# Patient Record
Sex: FEMALE | Race: WHITE | NOT HISPANIC OR LATINO | Employment: FULL TIME
[De-identification: names, ages, dates, MRNs, and addresses within clinical notes are randomized per-mention and may not be internally consistent; named-entity substitution may affect disease eponyms.]

---

## 2022-05-06 ENCOUNTER — TRANSCRIBE ORDERS (OUTPATIENT)
Dept: OTHER | Age: 42
End: 2022-05-06

## 2022-05-06 ENCOUNTER — PATIENT OUTREACH (OUTPATIENT)
Dept: ONCOLOGY | Facility: CLINIC | Age: 42
End: 2022-05-06
Payer: COMMERCIAL

## 2022-05-06 DIAGNOSIS — C54.1 ENDOMETRIAL CANCER (H): Primary | ICD-10-CM

## 2022-05-06 NOTE — PROGRESS NOTES
New Patient Oncology Nurse Navigator Note     Referring provider: Zack Nazario DO      Referring Clinic/Organization: [facility]     Referred to (specialty:) Gyn Onc     Date Referral Received: May 6, 2022     Evaluation for:  Endometrial cancer     Clinical History (per Nurse review of records provided):    Patient is a 42 year old female who presented for surgical consultation with Dr. Dumont of [facility] due to heavy mesntrual bleeding.  Dr. Dumont performed endometrial biopsy.    3/10/22 Transvaginal ultrasound   Uterus measures 8.0 x 5.0 x 6.0 cm.  Overall homogeneous echogenicity.   Endometrium is homogeneous at 5 mm in thickness.  IUD cannot be visualized.  No   endometrial fluid.     Right ovary not well seen.  No gross right adnexal abnormality.  Left ovary   measures 3.4 x 2.0 x 2.6 cm.  Incidental 2.8 cm simple cysts noted, small   daughter cyst.  No free fluid.     IMPRESSION:   1. IUD not identified.  Endometrium appears normal.   2. No concerning adnexal abnormality.    4/25/22 -   A. ENDOMETRIUM, BIOPSY  --ENDOMETRIOID ADENOCARCINOMA, FIGO GRADE 1 OF 3, ARISING IN A BACKGROUND OF ATYPICAL ENDOMETRIAL HYPERPLASIA  --MISMATCH REPAIR PROTEIN IMMUNOHISTOCHEMISTRY IS NORMAL    Records Location: Care Everywhere, Media and See Bookmarked material

## 2022-05-09 ENCOUNTER — DOCUMENTATION ONLY (OUTPATIENT)
Dept: ONCOLOGY | Facility: CLINIC | Age: 42
End: 2022-05-09
Payer: COMMERCIAL

## 2022-05-09 NOTE — PROGRESS NOTES
Action May 9, 2022 10:57 AM ABT   Action Taken Records updated in CE, image request sent to [facility] and     12:12 PM  Images from  received and resolved to PACS

## 2022-05-10 NOTE — TELEPHONE ENCOUNTER
RECORDS STATUS - ALL OTHER DIAGNOSIS      RECORDS RECEIVED FROM: PHILL Roman   DATE RECEIVED:    NOTES STATUS DETAILS   OFFICE NOTE from referring provider Zack Castillo DO: 22   DISCHARGE SUMMARY from hospital THAIS - Abel 22   OPERATIVE REPORT PHILL Spence  22: Endometrium Bx    HP  7/15/19: Endometrium Bx   MEDICATION LIST [facility]   LABS     PATHOLOGY REPORTS [facility] [employer] [facility] 22   ANYTHING RELATED TO DIAGNOSIS Epic/CE 22   IMAGING (NEED IMAGES & REPORT)     ULTRASOUND PACS 3/10/22: Abel    19, 17: PHILL

## 2022-05-15 NOTE — PROGRESS NOTES
"                        Consult Notes on Referred Patient    Date: 5/15/2022       Dr. Ratna Avina MD  No address on file       RE: Indigo Gunter  : 1980  MARY: 2022    Dear Dr. Referred Self:    I had the pleasure of seeing your patient Indigo Gunter here at the Gynecologic Cancer Clinic at the [facility] on 2022.  As you know she is a very pleasant 42 year old woman with a recent diagnosis of  EMCA G1.  Given these findings she was subsequently sent to the Gynecologic Cancer Clinic for new patient consultation.     She was recently evaluated by DR. Dumont who reported the following:    \" Indigo Gunter is a 42 Y  female who presents for surgical consultation.     The patient continues to have heavy menstrual bleeding. Due to the heavy bleeding she was having last week and, she required the use of Provera. She has not had unprotected intercourse for the past 2 weeks, and desires to proceed with Depo-Provera in the meantime until her surgery can be performed.    The patient desires to proceed with definitive management of her heavy menstrual bleeding with robotic assisted total laparoscopic hysterectomy.    The patient had previous had an IUD placed, however this was expelled. Her second IUD had been inserted. Due to heavy bleeding, she had a high index of suspicion of expulsion of the device, however she never actually visualized the device after was expelled. She had an ultrasound on 3/10/2022 which did not show any evidence of IUD within the endometrium.\"      22 EMBx:  Final Diagnosis     A. ENDOMETRIUM, BIOPSY  --ENDOMETRIOID ADENOCARCINOMA, FIGO GRADE 1 OF 3, ARISING IN A BACKGROUND OF ATYPICAL ENDOMETRIAL HYPERPLASIA  --MISMATCH REPAIR PROTEIN IMMUNOHISTOCHEMISTRY IS NORMAL             Review of Systems:    Systemic           no weight changes; no fever; no chills; no night sweats; no appetite changes  Skin           no rashes, or lesions  Eye           no " "irritation; no changes in vision  Ricky-Laryngeal           no dysphagia; no hoarseness   Pulmonary    no cough; no shortness of breath  Cardiovascular    no chest pain; no palpitations  Gastrointestinal    no diarrhea; no constipation; no abdominal pain; no changes in bowel  habits; no blood in stool  Genitourinary   no urinary frequency; no urinary urgency; no dysuria; no pain; no abnormal vaginal discharge; no abnormal vaginal bleeding  Breast   no breast discharge; no breast changes; no breast pain  Musculoskeletal    no myalgias; no arthralgias; no back pain  Psychiatric           no depressed mood; no anxiety    Hematologic           no tender lymph nodes; no noticeable swellings or lumps   Endocrine    no hot flashes; no heat/cold intolerance         Neurological   no tremor; no numbness and tingling; no headaches; no difficulty  sleeping      Past Medical History:    No past medical history on file.      Past Surgical History:    No past surgical history on file.      Health Maintenance:  Health Maintenance Due   Topic Date Due     PREVENTIVE CARE VISIT  Never done     ADVANCE CARE PLANNING  Never done     COVID-19 Vaccine (1) Never done     HIV SCREENING  Never done     HEPATITIS C SCREENING  Never done     PHQ-2 (once per calendar year)  Never done       Last Pap Smear: Up to date per patient - no abnormal paps    Last Mammogram: 2021 Nl per patient    Last Colonoscopy: none      Current Medications:     currently has no medications in their medication list.       Allergies:     Patient has no allergy information on record.        Social History:     Social History     Tobacco Use     Smoking status: Not on file     Smokeless tobacco: Not on file   Substance Use Topics     Alcohol use: Not on file       History   Drug Use Not on file           Physical Exam:     PS 0  VS: /83 (BP Location: Right arm, Patient Position: Sitting)   Pulse 66   Temp 98.3  F (36.8  C) (Oral)   Ht 1.715 m (5' 7.52\")   Wt " (!) 159.2 kg (351 lb)   SpO2 97%   BMI 54.13 kg/m      General Appearance: healthy and alert, no distress     HEENT:  no thyromegaly, no palpable nodules or masses        Cardiovascular: regular rate and rhythm, no gallops, rubs or murmurs     Respiratory: lungs clear, no rales, rhonchi or wheezes, normal diaphragmatic excursion    Musculoskeletal: extremities non tender and without edema    Skin: no lesions or rashes     Neurological: normal gait, no gross defects     Psychiatric: appropriate mood and affect                               Hematological: normal cervical, supraclavicular and inguinal lymph nodes     Gastrointestinal:       abdomen soft, non-tender, non-distended, no organomegaly or masses    Genitourinary: External genitalia and urethral meatus appears normal.  Vagina is smooth without nodularity or masses.  Cervix appears normal and without lesions.  Bimanual exam reveal no masses, nodularity or fullness.  Recto-vaginal exam confirms these findings.      Assessment:    Indigo Gunter is a 42 year old woman with a new diagnosis of EMCA G1.     A total of 60 minutes was spent with the patient, 40 minutes of which were spent in counseling the patient and/or treatment planning.      Plan:     1.)   EMCA  -  we have discussed the pathologic an clinical findings and she is aware of the potential for occult malignancy that is worse than her current diagnosis.  We have discussed options including medical and surgical management. After a comprehensive discussion of the relative risks and benefits of each strategy she is inclined to surgery, which is reasonable.  I have discussed the potential risks and benefits of ovarian removal at the time of hysterectomy and she is inclined to TLH/BSO, with staging as indicated by the intra-operative findings.  Will plan for sentinel LN biopsy.    WE discussed the pros and cons of ovarian preservation and I have advocated for removal.      She is amenable to bariatric  surgery clinic referral for weight loss management after surgery as part of a more global health approach     2.) Genetic risk factors were assessed and the patient does not meet the qualifications for a referral.      3.) Labs and/or tests ordered include:  Pre-op labs.     4.) Health maintenance issues addressed today include none.    5.) Pre-op teaching was completed today.  Risks of surgery were discussed to include: bleeding, transfusion, infection, unintentional injury to surrounding organs/structures.      Thank you for allowing us to participate in the care of your patient.         Sincerely,        Patient Care Team:  Kary Garcia APRN CNP as PCP - General  SELF, REFERRED

## 2022-05-16 ENCOUNTER — ONCOLOGY VISIT (OUTPATIENT)
Dept: ONCOLOGY | Facility: CLINIC | Age: 42
End: 2022-05-16
Attending: OBSTETRICS & GYNECOLOGY
Payer: COMMERCIAL

## 2022-05-16 ENCOUNTER — PRE VISIT (OUTPATIENT)
Dept: ONCOLOGY | Facility: CLINIC | Age: 42
End: 2022-05-16
Payer: COMMERCIAL

## 2022-05-16 ENCOUNTER — ANCILLARY PROCEDURE (OUTPATIENT)
Dept: GENERAL RADIOLOGY | Facility: CLINIC | Age: 42
End: 2022-05-16
Attending: OBSTETRICS & GYNECOLOGY
Payer: COMMERCIAL

## 2022-05-16 ENCOUNTER — LAB (OUTPATIENT)
Dept: LAB | Facility: CLINIC | Age: 42
End: 2022-05-16

## 2022-05-16 VITALS
SYSTOLIC BLOOD PRESSURE: 120 MMHG | HEART RATE: 66 BPM | BODY MASS INDEX: 44.41 KG/M2 | HEIGHT: 68 IN | TEMPERATURE: 98.3 F | OXYGEN SATURATION: 97 % | DIASTOLIC BLOOD PRESSURE: 83 MMHG | WEIGHT: 293 LBS

## 2022-05-16 DIAGNOSIS — C54.1 ENDOMETRIAL CANCER (H): ICD-10-CM

## 2022-05-16 LAB
ALBUMIN SERPL-MCNC: 3.8 G/DL (ref 3.4–5)
ALP SERPL-CCNC: 119 U/L (ref 40–150)
ALT SERPL W P-5'-P-CCNC: 26 U/L (ref 0–50)
ANION GAP SERPL CALCULATED.3IONS-SCNC: 7 MMOL/L (ref 3–14)
AST SERPL W P-5'-P-CCNC: 16 U/L (ref 0–45)
BASOPHILS # BLD AUTO: 0 10E3/UL (ref 0–0.2)
BASOPHILS NFR BLD AUTO: 0 %
BILIRUB SERPL-MCNC: 0.3 MG/DL (ref 0.2–1.3)
BUN SERPL-MCNC: 13 MG/DL (ref 7–30)
CALCIUM SERPL-MCNC: 9.4 MG/DL (ref 8.5–10.1)
CHLORIDE BLD-SCNC: 107 MMOL/L (ref 94–109)
CO2 SERPL-SCNC: 26 MMOL/L (ref 20–32)
CREAT SERPL-MCNC: 0.7 MG/DL (ref 0.52–1.04)
EOSINOPHIL # BLD AUTO: 0.2 10E3/UL (ref 0–0.7)
EOSINOPHIL NFR BLD AUTO: 2 %
ERYTHROCYTE [DISTWIDTH] IN BLOOD BY AUTOMATED COUNT: 15 % (ref 10–15)
GFR SERPL CREATININE-BSD FRML MDRD: >90 ML/MIN/1.73M2
GLUCOSE BLD-MCNC: 123 MG/DL (ref 70–99)
HCT VFR BLD AUTO: 37.8 % (ref 35–47)
HGB BLD-MCNC: 11.1 G/DL (ref 11.7–15.7)
IMM GRANULOCYTES # BLD: 0 10E3/UL
IMM GRANULOCYTES NFR BLD: 0 %
LYMPHOCYTES # BLD AUTO: 3 10E3/UL (ref 0.8–5.3)
LYMPHOCYTES NFR BLD AUTO: 26 %
MCH RBC QN AUTO: 22.4 PG (ref 26.5–33)
MCHC RBC AUTO-ENTMCNC: 29.4 G/DL (ref 31.5–36.5)
MCV RBC AUTO: 76 FL (ref 78–100)
MONOCYTES # BLD AUTO: 0.9 10E3/UL (ref 0–1.3)
MONOCYTES NFR BLD AUTO: 8 %
NEUTROPHILS # BLD AUTO: 7.3 10E3/UL (ref 1.6–8.3)
NEUTROPHILS NFR BLD AUTO: 64 %
NRBC # BLD AUTO: 0 10E3/UL
NRBC BLD AUTO-RTO: 0 /100
PLATELET # BLD AUTO: 422 10E3/UL (ref 150–450)
POTASSIUM BLD-SCNC: 4.1 MMOL/L (ref 3.4–5.3)
PROT SERPL-MCNC: 8.1 G/DL (ref 6.8–8.8)
RBC # BLD AUTO: 4.95 10E6/UL (ref 3.8–5.2)
SODIUM SERPL-SCNC: 140 MMOL/L (ref 133–144)
WBC # BLD AUTO: 11.5 10E3/UL (ref 4–11)

## 2022-05-16 PROCEDURE — G0463 HOSPITAL OUTPT CLINIC VISIT: HCPCS

## 2022-05-16 PROCEDURE — 99205 OFFICE O/P NEW HI 60 MIN: CPT | Performed by: OBSTETRICS & GYNECOLOGY

## 2022-05-16 PROCEDURE — 80053 COMPREHEN METABOLIC PANEL: CPT | Performed by: PATHOLOGY

## 2022-05-16 PROCEDURE — 93010 ELECTROCARDIOGRAM REPORT: CPT | Mod: 59 | Performed by: INTERNAL MEDICINE

## 2022-05-16 PROCEDURE — 71046 X-RAY EXAM CHEST 2 VIEWS: CPT | Mod: GC | Performed by: RADIOLOGY

## 2022-05-16 PROCEDURE — 85025 COMPLETE CBC W/AUTO DIFF WBC: CPT | Performed by: PATHOLOGY

## 2022-05-16 PROCEDURE — 36415 COLL VENOUS BLD VENIPUNCTURE: CPT | Performed by: PATHOLOGY

## 2022-05-16 RX ORDER — ATORVASTATIN CALCIUM 40 MG/1
40 TABLET, FILM COATED ORAL
COMMUNITY
Start: 2020-06-10 | End: 2023-04-19

## 2022-05-16 RX ORDER — LISINOPRIL 10 MG/1
10 TABLET ORAL
COMMUNITY
Start: 2020-06-10 | End: 2023-01-09

## 2022-05-16 RX ORDER — MEDROXYPROGESTERONE ACETATE 10 MG
10 TABLET ORAL DAILY
Status: ON HOLD | COMMUNITY
End: 2022-06-09

## 2022-05-16 RX ORDER — CEFAZOLIN SODIUM IN 0.9 % NACL 3 G/100 ML
3 INTRAVENOUS SOLUTION, PIGGYBACK (ML) INTRAVENOUS
Status: CANCELLED | OUTPATIENT
Start: 2022-05-16

## 2022-05-16 RX ORDER — PHENAZOPYRIDINE HYDROCHLORIDE 200 MG/1
200 TABLET, FILM COATED ORAL ONCE
Status: CANCELLED | OUTPATIENT
Start: 2022-05-16 | End: 2022-05-16

## 2022-05-16 RX ORDER — CEFAZOLIN SODIUM IN 0.9 % NACL 3 G/100 ML
3 INTRAVENOUS SOLUTION, PIGGYBACK (ML) INTRAVENOUS
Status: DISCONTINUED | OUTPATIENT
Start: 2022-05-16 | End: 2022-05-16 | Stop reason: CLARIF

## 2022-05-16 RX ORDER — VIT C/HESPERIDIN/BIOFLAVONOIDS 500-100 MG
30 TABLET ORAL DAILY
COMMUNITY

## 2022-05-16 RX ORDER — ALBUTEROL SULFATE 90 UG/1
2 AEROSOL, METERED RESPIRATORY (INHALATION)
COMMUNITY
Start: 2022-04-19 | End: 2023-04-19

## 2022-05-16 RX ORDER — METRONIDAZOLE 500 MG/100ML
500 INJECTION, SOLUTION INTRAVENOUS
Status: CANCELLED | OUTPATIENT
Start: 2022-05-16

## 2022-05-16 RX ORDER — HEPARIN SODIUM 5000 [USP'U]/.5ML
5000 INJECTION, SOLUTION INTRAVENOUS; SUBCUTANEOUS
Status: CANCELLED | OUTPATIENT
Start: 2022-05-16

## 2022-05-16 RX ORDER — ASPIRIN 81 MG/1
TABLET, CHEWABLE ORAL
COMMUNITY
Start: 2022-02-26

## 2022-05-16 ASSESSMENT — PAIN SCALES - GENERAL: PAINLEVEL: NO PAIN (0)

## 2022-05-16 NOTE — LETTER
"    2022         RE: Indigo Gunter  [address]  [place] [address]        Dear Colleague,    Thank you for referring your patient, Indigo Gunter, to the [facility]. Please see a copy of my visit note below.                            Consult Notes on Referred Patient    Date: 5/15/2022       Dr. Ratna Avina MD  No address on file       RE: Indigo Gunter  : 1980  MARY: 2022    Dear Dr. Referred Self:    I had the pleasure of seeing your patient Indigo Gunter here at the Gynecologic Cancer Clinic at the [facility] on 2022.  As you know she is a very pleasant 42 year old woman with a recent diagnosis of  EMCA G1.  Given these findings she was subsequently sent to the Gynecologic Cancer Clinic for new patient consultation.     She was recently evaluated by DR. Dumont who reported the following:    \" Indigo Gunter is a 42 Y  female who presents for surgical consultation.     The patient continues to have heavy menstrual bleeding. Due to the heavy bleeding she was having last week and, she required the use of Provera. She has not had unprotected intercourse for the past 2 weeks, and desires to proceed with Depo-Provera in the meantime until her surgery can be performed.    The patient desires to proceed with definitive management of her heavy menstrual bleeding with robotic assisted total laparoscopic hysterectomy.    The patient had previous had an IUD placed, however this was expelled. Her second IUD had been inserted. Due to heavy bleeding, she had a high index of suspicion of expulsion of the device, however she never actually visualized the device after was expelled. She had an ultrasound on 3/10/2022 which did not show any evidence of IUD within the endometrium.\"      22 EMBx:  Final Diagnosis     A. ENDOMETRIUM, BIOPSY  --ENDOMETRIOID ADENOCARCINOMA, FIGO GRADE 1 OF 3, ARISING IN A BACKGROUND OF ATYPICAL " ENDOMETRIAL HYPERPLASIA  --MISMATCH REPAIR PROTEIN IMMUNOHISTOCHEMISTRY IS NORMAL             Review of Systems:    Systemic           no weight changes; no fever; no chills; no night sweats; no appetite changes  Skin           no rashes, or lesions  Eye           no irritation; no changes in vision  Ricky-Laryngeal           no dysphagia; no hoarseness   Pulmonary    no cough; no shortness of breath  Cardiovascular    no chest pain; no palpitations  Gastrointestinal    no diarrhea; no constipation; no abdominal pain; no changes in bowel  habits; no blood in stool  Genitourinary   no urinary frequency; no urinary urgency; no dysuria; no pain; no abnormal vaginal discharge; no abnormal vaginal bleeding  Breast   no breast discharge; no breast changes; no breast pain  Musculoskeletal    no myalgias; no arthralgias; no back pain  Psychiatric           no depressed mood; no anxiety    Hematologic           no tender lymph nodes; no noticeable swellings or lumps   Endocrine    no hot flashes; no heat/cold intolerance         Neurological   no tremor; no numbness and tingling; no headaches; no difficulty  sleeping      Past Medical History:    No past medical history on file.      Past Surgical History:    No past surgical history on file.      Health Maintenance:  Health Maintenance Due   Topic Date Due     PREVENTIVE CARE VISIT  Never done     ADVANCE CARE PLANNING  Never done     COVID-19 Vaccine (1) Never done     HIV SCREENING  Never done     HEPATITIS C SCREENING  Never done     PHQ-2 (once per calendar year)  Never done       Last Pap Smear: Up to date per patient - no abnormal paps    Last Mammogram: 2021 Nl per patient    Last Colonoscopy: none      Current Medications:     currently has no medications in their medication list.       Allergies:     Patient has no allergy information on record.        Social History:     Social History     Tobacco Use     Smoking status: Not on file     Smokeless tobacco: Not on  "file   Substance Use Topics     Alcohol use: Not on file       History   Drug Use Not on file           Physical Exam:     PS 0  VS: /83 (BP Location: Right arm, Patient Position: Sitting)   Pulse 66   Temp 98.3  F (36.8  C) (Oral)   Ht 1.715 m (5' 7.52\")   Wt (!) 159.2 kg (351 lb)   SpO2 97%   BMI 54.13 kg/m      General Appearance: healthy and alert, no distress     HEENT:  no thyromegaly, no palpable nodules or masses        Cardiovascular: regular rate and rhythm, no gallops, rubs or murmurs     Respiratory: lungs clear, no rales, rhonchi or wheezes, normal diaphragmatic excursion    Musculoskeletal: extremities non tender and without edema    Skin: no lesions or rashes     Neurological: normal gait, no gross defects     Psychiatric: appropriate mood and affect                               Hematological: normal cervical, supraclavicular and inguinal lymph nodes     Gastrointestinal:       abdomen soft, non-tender, non-distended, no organomegaly or masses    Genitourinary: External genitalia and urethral meatus appears normal.  Vagina is smooth without nodularity or masses.  Cervix appears normal and without lesions.  Bimanual exam reveal no masses, nodularity or fullness.  Recto-vaginal exam confirms these findings.      Assessment:    Indigo Gunter is a 42 year old woman with a new diagnosis of EMCA G1.     A total of 60 minutes was spent with the patient, 40 minutes of which were spent in counseling the patient and/or treatment planning.      Plan:     1.)   EMCA  -  we have discussed the pathologic an clinical findings and she is aware of the potential for occult malignancy that is worse than her current diagnosis.  We have discussed options including medical and surgical management. After a comprehensive discussion of the relative risks and benefits of each strategy she is inclined to surgery, which is reasonable.  I have discussed the potential risks and benefits of ovarian removal at the " time of hysterectomy and she is inclined to TLH/BSO, with staging as indicated by the intra-operative findings.  Will plan for sentinel LN biopsy.    WE discussed the pros and cons of ovarian preservation and I have advocated for removal.      She is amenable to bariatric surgery clinic referral for weight loss management after surgery as part of a more global health approach     2.) Genetic risk factors were assessed and the patient does not meet the qualifications for a referral.      3.) Labs and/or tests ordered include:  Pre-op labs.     4.) Health maintenance issues addressed today include none.    5.) Pre-op teaching was completed today.  Risks of surgery were discussed to include: bleeding, transfusion, infection, unintentional injury to surrounding organs/structures.      Thank you for allowing us to participate in the care of your patient.         Sincerely,        Kayden Harmon MD      CC  Patient Care Team:  Kary Garcia APRN CNP as PCP - General

## 2022-05-16 NOTE — NURSING NOTE
"Oncology Rooming Note    May 16, 2022 12:41 PM   Indigo Gunter is a 42 year old female who presents for:    Chief Complaint   Patient presents with     Oncology Clinic Visit     Endometrial cancer     Initial Vitals: /83 (BP Location: Right arm, Patient Position: Sitting)   Pulse 66   Temp 98.3  F (36.8  C) (Oral)   Ht 1.715 m (5' 7.52\")   Wt (!) 159.2 kg (351 lb)   SpO2 97%   BMI 54.13 kg/m   Estimated body mass index is 54.13 kg/m  as calculated from the following:    Height as of this encounter: 1.715 m (5' 7.52\").    Weight as of this encounter: 159.2 kg (351 lb). Body surface area is 2.75 meters squared.  No Pain (0) Comment: Data Unavailable   No LMP recorded. (Menstrual status: Birth Control).  Allergies reviewed: Yes  Medications reviewed: Yes    Medications: Medication refills not needed today.  Pharmacy name entered into EPIC: Data Unavailable    Clinical concerns: none       Arik Scales            "

## 2022-05-17 LAB
ATRIAL RATE - MUSE: 68 BPM
DIASTOLIC BLOOD PRESSURE - MUSE: NORMAL MMHG
INTERPRETATION ECG - MUSE: NORMAL
P AXIS - MUSE: 26 DEGREES
PR INTERVAL - MUSE: 138 MS
QRS DURATION - MUSE: 82 MS
QT - MUSE: 402 MS
QTC - MUSE: 427 MS
R AXIS - MUSE: 30 DEGREES
SYSTOLIC BLOOD PRESSURE - MUSE: NORMAL MMHG
T AXIS - MUSE: 21 DEGREES
VENTRICULAR RATE- MUSE: 68 BPM

## 2022-05-17 NOTE — NURSING NOTE
Pre Op Nurse Teaching Template    Relevant Diagnosis: endometrial cancer    Teaching Topic: Laparoscopy, removal of uterus, tubes, ovaries, cervix, lymph nodes.  POssible open surgery, possible cancer operation, look into bladder (cystoscopy),     Person(s) involved in teaching :  Patient  & spouse  Motivation Level:  Asks Questions:    Yes      Eager to Learn:     Yes     Cooperative:          Yes    Receptive (willing. Able to accept information):    Yes      Patient and those who are listed above demonstrates understanding of the following:   Reason for the appointment, diagnosis and treatment plan:   Yes   Knowledge of proper use of medications and conditions for which they are ordered (with special attention to potential side effects or drug interactions): Yes   Which situations necessitate calling provider and whom to contact: Yes         Nutritional needs and diet plan:  Yes      Pain management techniques:     Yes, Pain Scale   Diet:   Yes, [facility] Diet Instructions    Teaching Concerns addressed: Yes    Infection Prevention:  Patient and those who are listed above demonstrate understanding of the following:  Pre-Op CHG Bathing Instructions: Yes  Surgical procedure site care taught:   Yes   Signs and symptoms of infection taught: Yes       Instructional Materials Used/Given:  The [place] Before You Surgery Booklet  Showering or Bathing before Surgery Instructions & CHG Product  Hysterectomy Guidelines  Pain Assessment Tool   Home Care after Major Abdominal or Vaginal Surgery  Map  Accommodations Brochure  Phone numbers for [facility] and [facility]  Copy of Surgical Consent    Done Today:  Lab work,Chest Xray,   Preop Visit  not needed   Tests Ordered:  Post Op Visit Scheduled:tbd  Comments:    Surgery date/time:tbd      Covid test to be done 3-4 days before surgery date    Consent signed via IPAD and on file in media  .

## 2022-05-18 ENCOUNTER — LAB (OUTPATIENT)
Dept: LAB | Facility: CLINIC | Age: 42
End: 2022-05-18
Payer: COMMERCIAL

## 2022-05-18 ENCOUNTER — DOCUMENTATION ONLY (OUTPATIENT)
Dept: ONCOLOGY | Facility: CLINIC | Age: 42
End: 2022-05-18
Payer: COMMERCIAL

## 2022-05-18 DIAGNOSIS — C54.1 ENDOMETRIAL CANCER (H): Primary | ICD-10-CM

## 2022-05-18 PROCEDURE — 88321 CONSLTJ&REPRT SLD PREP ELSWR: CPT | Mod: GC | Performed by: PATHOLOGY

## 2022-05-18 NOTE — PROGRESS NOTES
RN Care Coordinator: Jovana Mercado; [phone]    Surgery is scheduled with Dr. Harmon on 6/8 at the [facility].  Scheduled per AVAILABILITY.    H&P to be completed by Surgeon     COVID-19 test: 6/[facility] at [facility]    Post-op: will be scheduled by the clinic    Patient will receive a phone call from pre-admission nurses 1-2 days prior to surgery with arrival and start time.    RNCC to contact patient, and so I do not need to contact the patient at this time. RNCC will contact me if I need to reach out to the patient. No further action needed at this time.     Surgery packet was provided by the RN during appointment

## 2022-05-20 LAB
PATH REPORT.COMMENTS IMP SPEC: ABNORMAL
PATH REPORT.COMMENTS IMP SPEC: ABNORMAL
PATH REPORT.COMMENTS IMP SPEC: YES
PATH REPORT.FINAL DX SPEC: ABNORMAL
PATH REPORT.GROSS SPEC: ABNORMAL
PATH REPORT.MICROSCOPIC SPEC OTHER STN: ABNORMAL
PATH REPORT.RELEVANT HX SPEC: ABNORMAL
PATH REPORT.RELEVANT HX SPEC: ABNORMAL
PATH REPORT.SITE OF ORIGIN SPEC: ABNORMAL

## 2022-05-30 DIAGNOSIS — Z11.59 ENCOUNTER FOR SCREENING FOR OTHER VIRAL DISEASES: Primary | ICD-10-CM

## 2022-06-07 ENCOUNTER — ANESTHESIA EVENT (OUTPATIENT)
Dept: SURGERY | Facility: CLINIC | Age: 42
DRG: 740 | End: 2022-06-07
Payer: COMMERCIAL

## 2022-06-08 ENCOUNTER — ANESTHESIA (OUTPATIENT)
Dept: SURGERY | Facility: CLINIC | Age: 42
DRG: 740 | End: 2022-06-08
Payer: COMMERCIAL

## 2022-06-08 ENCOUNTER — HOSPITAL ENCOUNTER (INPATIENT)
Facility: CLINIC | Age: 42
LOS: 1 days | Discharge: HOME | DRG: 740 | End: 2022-06-09
Attending: OBSTETRICS & GYNECOLOGY | Admitting: OBSTETRICS & GYNECOLOGY
Payer: COMMERCIAL

## 2022-06-08 DIAGNOSIS — C54.1 ENDOMETRIAL CANCER (H): Primary | ICD-10-CM

## 2022-06-08 LAB
ABO/RH(D): NORMAL
ANTIBODY SCREEN: NEGATIVE
ERYTHROCYTE [DISTWIDTH] IN BLOOD BY AUTOMATED COUNT: 15.7 % (ref 10–15)
GLUCOSE BLDC GLUCOMTR-MCNC: 138 MG/DL (ref 70–99)
HCG UR QL: NEGATIVE
HCT VFR BLD AUTO: 38.3 % (ref 35–47)
HGB BLD-MCNC: 11.4 G/DL (ref 11.7–15.7)
HOLD SPECIMEN: NORMAL
MCH RBC QN AUTO: 22.4 PG (ref 26.5–33)
MCHC RBC AUTO-ENTMCNC: 29.8 G/DL (ref 31.5–36.5)
MCV RBC AUTO: 75 FL (ref 78–100)
PLATELET # BLD AUTO: 355 10E3/UL (ref 150–450)
RBC # BLD AUTO: 5.1 10E6/UL (ref 3.8–5.2)
SPECIMEN EXPIRATION DATE: NORMAL
WBC # BLD AUTO: 16.4 10E3/UL (ref 4–11)

## 2022-06-08 PROCEDURE — 710N000012 HC RECOVERY PHASE 2, PER MINUTE: Performed by: OBSTETRICS & GYNECOLOGY

## 2022-06-08 PROCEDURE — 88305 TISSUE EXAM BY PATHOLOGIST: CPT | Mod: 26 | Performed by: PATHOLOGY

## 2022-06-08 PROCEDURE — 370N000017 HC ANESTHESIA TECHNICAL FEE, PER MIN: Performed by: OBSTETRICS & GYNECOLOGY

## 2022-06-08 PROCEDURE — 250N000013 HC RX MED GY IP 250 OP 250 PS 637: Performed by: ANESTHESIOLOGY

## 2022-06-08 PROCEDURE — 250N000009 HC RX 250: Performed by: ANESTHESIOLOGY

## 2022-06-08 PROCEDURE — 250N000011 HC RX IP 250 OP 636: Performed by: OBSTETRICS & GYNECOLOGY

## 2022-06-08 PROCEDURE — 250N000011 HC RX IP 250 OP 636: Performed by: ANESTHESIOLOGY

## 2022-06-08 PROCEDURE — 258N000003 HC RX IP 258 OP 636: Performed by: STUDENT IN AN ORGANIZED HEALTH CARE EDUCATION/TRAINING PROGRAM

## 2022-06-08 PROCEDURE — 710N000010 HC RECOVERY PHASE 1, LEVEL 2, PER MIN: Performed by: OBSTETRICS & GYNECOLOGY

## 2022-06-08 PROCEDURE — 360N000077 HC SURGERY LEVEL 4, PER MIN: Performed by: OBSTETRICS & GYNECOLOGY

## 2022-06-08 PROCEDURE — 250N000013 HC RX MED GY IP 250 OP 250 PS 637: Performed by: OBSTETRICS & GYNECOLOGY

## 2022-06-08 PROCEDURE — 0UT9FZZ RESECTION OF UTERUS, VIA NATURAL OR ARTIFICIAL OPENING WITH PERCUTANEOUS ENDOSCOPIC ASSISTANCE: ICD-10-PCS | Performed by: OBSTETRICS & GYNECOLOGY

## 2022-06-08 PROCEDURE — 88331 PATH CONSLTJ SURG 1 BLK 1SPC: CPT | Mod: 26 | Performed by: PATHOLOGY

## 2022-06-08 PROCEDURE — 250N000009 HC RX 250: Performed by: NURSE ANESTHETIST, CERTIFIED REGISTERED

## 2022-06-08 PROCEDURE — 36415 COLL VENOUS BLD VENIPUNCTURE: CPT | Performed by: STUDENT IN AN ORGANIZED HEALTH CARE EDUCATION/TRAINING PROGRAM

## 2022-06-08 PROCEDURE — 81025 URINE PREGNANCY TEST: CPT | Performed by: OBSTETRICS & GYNECOLOGY

## 2022-06-08 PROCEDURE — 88309 TISSUE EXAM BY PATHOLOGIST: CPT | Mod: 26 | Performed by: PATHOLOGY

## 2022-06-08 PROCEDURE — 88112 CYTOPATH CELL ENHANCE TECH: CPT | Mod: 26 | Performed by: PATHOLOGY

## 2022-06-08 PROCEDURE — 120N000002 HC R&B MED SURG/OB UMMC

## 2022-06-08 PROCEDURE — 07BC4ZX EXCISION OF PELVIS LYMPHATIC, PERCUTANEOUS ENDOSCOPIC APPROACH, DIAGNOSTIC: ICD-10-PCS | Performed by: OBSTETRICS & GYNECOLOGY

## 2022-06-08 PROCEDURE — 258N000003 HC RX IP 258 OP 636: Performed by: ANESTHESIOLOGY

## 2022-06-08 PROCEDURE — 999N000141 HC STATISTIC PRE-PROCEDURE NURSING ASSESSMENT: Performed by: OBSTETRICS & GYNECOLOGY

## 2022-06-08 PROCEDURE — 86850 RBC ANTIBODY SCREEN: CPT | Performed by: ANESTHESIOLOGY

## 2022-06-08 PROCEDURE — 0UT24ZZ RESECTION OF BILATERAL OVARIES, PERCUTANEOUS ENDOSCOPIC APPROACH: ICD-10-PCS | Performed by: OBSTETRICS & GYNECOLOGY

## 2022-06-08 PROCEDURE — 0TJB8ZZ INSPECTION OF BLADDER, VIA NATURAL OR ARTIFICIAL OPENING ENDOSCOPIC: ICD-10-PCS | Performed by: OBSTETRICS & GYNECOLOGY

## 2022-06-08 PROCEDURE — 250N000009 HC RX 250

## 2022-06-08 PROCEDURE — 250N000013 HC RX MED GY IP 250 OP 250 PS 637: Performed by: STUDENT IN AN ORGANIZED HEALTH CARE EDUCATION/TRAINING PROGRAM

## 2022-06-08 PROCEDURE — 88305 TISSUE EXAM BY PATHOLOGIST: CPT | Mod: TC | Performed by: OBSTETRICS & GYNECOLOGY

## 2022-06-08 PROCEDURE — 99024 POSTOP FOLLOW-UP VISIT: CPT | Performed by: OBSTETRICS & GYNECOLOGY

## 2022-06-08 PROCEDURE — 250N000009 HC RX 250: Performed by: OBSTETRICS & GYNECOLOGY

## 2022-06-08 PROCEDURE — 36415 COLL VENOUS BLD VENIPUNCTURE: CPT | Performed by: ANESTHESIOLOGY

## 2022-06-08 PROCEDURE — 250N000011 HC RX IP 250 OP 636: Performed by: NURSE ANESTHETIST, CERTIFIED REGISTERED

## 2022-06-08 PROCEDURE — 272N000001 HC OR GENERAL SUPPLY STERILE: Performed by: OBSTETRICS & GYNECOLOGY

## 2022-06-08 PROCEDURE — 88331 PATH CONSLTJ SURG 1 BLK 1SPC: CPT | Mod: TC | Performed by: OBSTETRICS & GYNECOLOGY

## 2022-06-08 PROCEDURE — 250N000025 HC SEVOFLURANE, PER MIN: Performed by: OBSTETRICS & GYNECOLOGY

## 2022-06-08 PROCEDURE — 85027 COMPLETE CBC AUTOMATED: CPT | Performed by: STUDENT IN AN ORGANIZED HEALTH CARE EDUCATION/TRAINING PROGRAM

## 2022-06-08 PROCEDURE — 88307 TISSUE EXAM BY PATHOLOGIST: CPT | Mod: 26 | Performed by: PATHOLOGY

## 2022-06-08 PROCEDURE — 250N000011 HC RX IP 250 OP 636: Performed by: STUDENT IN AN ORGANIZED HEALTH CARE EDUCATION/TRAINING PROGRAM

## 2022-06-08 PROCEDURE — 0UT74ZZ RESECTION OF BILATERAL FALLOPIAN TUBES, PERCUTANEOUS ENDOSCOPIC APPROACH: ICD-10-PCS | Performed by: OBSTETRICS & GYNECOLOGY

## 2022-06-08 RX ORDER — ACETAMINOPHEN 325 MG/1
650 TABLET ORAL EVERY 6 HOURS
Status: DISCONTINUED | OUTPATIENT
Start: 2022-06-08 | End: 2022-06-08

## 2022-06-08 RX ORDER — KETOROLAC TROMETHAMINE 30 MG/ML
15 INJECTION, SOLUTION INTRAMUSCULAR; INTRAVENOUS EVERY 6 HOURS
Status: DISCONTINUED | OUTPATIENT
Start: 2022-06-09 | End: 2022-06-08

## 2022-06-08 RX ORDER — PROPOFOL 10 MG/ML
INJECTION, EMULSION INTRAVENOUS PRN
Status: DISCONTINUED | OUTPATIENT
Start: 2022-06-08 | End: 2022-06-08

## 2022-06-08 RX ORDER — CEFAZOLIN SODIUM/WATER 3 G/30 ML
3 SYRINGE (ML) INTRAVENOUS
Status: COMPLETED | OUTPATIENT
Start: 2022-06-08 | End: 2022-06-08

## 2022-06-08 RX ORDER — PROCHLORPERAZINE MALEATE 5 MG
10 TABLET ORAL EVERY 6 HOURS PRN
Status: DISCONTINUED | OUTPATIENT
Start: 2022-06-08 | End: 2022-06-08

## 2022-06-08 RX ORDER — FENTANYL CITRATE 50 UG/ML
INJECTION, SOLUTION INTRAMUSCULAR; INTRAVENOUS PRN
Status: DISCONTINUED | OUTPATIENT
Start: 2022-06-08 | End: 2022-06-08

## 2022-06-08 RX ORDER — SODIUM CHLORIDE, SODIUM LACTATE, POTASSIUM CHLORIDE, CALCIUM CHLORIDE 600; 310; 30; 20 MG/100ML; MG/100ML; MG/100ML; MG/100ML
INJECTION, SOLUTION INTRAVENOUS CONTINUOUS
Status: DISCONTINUED | OUTPATIENT
Start: 2022-06-08 | End: 2022-06-09

## 2022-06-08 RX ORDER — ACETAMINOPHEN 325 MG/1
975 TABLET ORAL EVERY 6 HOURS
Status: DISCONTINUED | OUTPATIENT
Start: 2022-06-09 | End: 2022-06-09 | Stop reason: HOSPADM

## 2022-06-08 RX ORDER — ONDANSETRON 4 MG/1
4 TABLET, ORALLY DISINTEGRATING ORAL EVERY 6 HOURS PRN
Status: DISCONTINUED | OUTPATIENT
Start: 2022-06-08 | End: 2022-06-09 | Stop reason: HOSPADM

## 2022-06-08 RX ORDER — ACETAMINOPHEN 325 MG/1
975 TABLET ORAL ONCE
Status: COMPLETED | OUTPATIENT
Start: 2022-06-08 | End: 2022-06-08

## 2022-06-08 RX ORDER — METRONIDAZOLE 500 MG/100ML
500 INJECTION, SOLUTION INTRAVENOUS
Status: DISCONTINUED | OUTPATIENT
Start: 2022-06-08 | End: 2022-06-08 | Stop reason: HOSPADM

## 2022-06-08 RX ORDER — LIDOCAINE 40 MG/G
CREAM TOPICAL
Status: DISCONTINUED | OUTPATIENT
Start: 2022-06-08 | End: 2022-06-09 | Stop reason: HOSPADM

## 2022-06-08 RX ORDER — HEPARIN SODIUM 5000 [USP'U]/.5ML
5000 INJECTION, SOLUTION INTRAVENOUS; SUBCUTANEOUS
Status: COMPLETED | OUTPATIENT
Start: 2022-06-08 | End: 2022-06-08

## 2022-06-08 RX ORDER — IBUPROFEN 800 MG/1
800 TABLET, FILM COATED ORAL EVERY 6 HOURS PRN
Qty: 12 TABLET | Refills: 0 | Status: SHIPPED | OUTPATIENT
Start: 2022-06-08 | End: 2023-01-09

## 2022-06-08 RX ORDER — IBUPROFEN 200 MG
800 TABLET ORAL ONCE
Status: DISCONTINUED | OUTPATIENT
Start: 2022-06-08 | End: 2022-06-08

## 2022-06-08 RX ORDER — ONDANSETRON 4 MG/1
4 TABLET, ORALLY DISINTEGRATING ORAL EVERY 30 MIN PRN
Status: DISCONTINUED | OUTPATIENT
Start: 2022-06-08 | End: 2022-06-08 | Stop reason: HOSPADM

## 2022-06-08 RX ORDER — HYDROMORPHONE HCL IN WATER/PF 6 MG/30 ML
0.2 PATIENT CONTROLLED ANALGESIA SYRINGE INTRAVENOUS EVERY 5 MIN PRN
Status: DISCONTINUED | OUTPATIENT
Start: 2022-06-08 | End: 2022-06-08 | Stop reason: HOSPADM

## 2022-06-08 RX ORDER — FENTANYL CITRATE 50 UG/ML
50 INJECTION, SOLUTION INTRAMUSCULAR; INTRAVENOUS EVERY 5 MIN PRN
Status: DISCONTINUED | OUTPATIENT
Start: 2022-06-08 | End: 2022-06-08 | Stop reason: HOSPADM

## 2022-06-08 RX ORDER — OXYCODONE HYDROCHLORIDE 10 MG/1
10 TABLET ORAL EVERY 4 HOURS PRN
Status: DISCONTINUED | OUTPATIENT
Start: 2022-06-08 | End: 2022-06-08

## 2022-06-08 RX ORDER — NALOXONE HYDROCHLORIDE 0.4 MG/ML
0.4 INJECTION, SOLUTION INTRAMUSCULAR; INTRAVENOUS; SUBCUTANEOUS
Status: DISCONTINUED | OUTPATIENT
Start: 2022-06-08 | End: 2022-06-09 | Stop reason: HOSPADM

## 2022-06-08 RX ORDER — ONDANSETRON 2 MG/ML
4 INJECTION INTRAMUSCULAR; INTRAVENOUS EVERY 30 MIN PRN
Status: DISCONTINUED | OUTPATIENT
Start: 2022-06-08 | End: 2022-06-08 | Stop reason: HOSPADM

## 2022-06-08 RX ORDER — OXYCODONE HYDROCHLORIDE 5 MG/1
5 TABLET ORAL
Status: DISCONTINUED | OUTPATIENT
Start: 2022-06-08 | End: 2022-06-08

## 2022-06-08 RX ORDER — HYDROMORPHONE HCL IN WATER/PF 6 MG/30 ML
0.4 PATIENT CONTROLLED ANALGESIA SYRINGE INTRAVENOUS
Status: DISCONTINUED | OUTPATIENT
Start: 2022-06-08 | End: 2022-06-08

## 2022-06-08 RX ORDER — OXYCODONE HYDROCHLORIDE 5 MG/1
5 TABLET ORAL EVERY 4 HOURS PRN
Status: DISCONTINUED | OUTPATIENT
Start: 2022-06-08 | End: 2022-06-09

## 2022-06-08 RX ORDER — HYDROMORPHONE HCL IN WATER/PF 6 MG/30 ML
0.2 PATIENT CONTROLLED ANALGESIA SYRINGE INTRAVENOUS
Status: DISCONTINUED | OUTPATIENT
Start: 2022-06-08 | End: 2022-06-08

## 2022-06-08 RX ORDER — BUPIVACAINE HYDROCHLORIDE 2.5 MG/ML
INJECTION, SOLUTION EPIDURAL; INFILTRATION; INTRACAUDAL PRN
Status: DISCONTINUED | OUTPATIENT
Start: 2022-06-08 | End: 2022-06-08 | Stop reason: HOSPADM

## 2022-06-08 RX ORDER — INDOCYANINE GREEN AND WATER 25 MG
KIT INJECTION PRN
Status: DISCONTINUED | OUTPATIENT
Start: 2022-06-08 | End: 2022-06-08 | Stop reason: HOSPADM

## 2022-06-08 RX ORDER — LIDOCAINE 40 MG/G
CREAM TOPICAL
Status: DISCONTINUED | OUTPATIENT
Start: 2022-06-08 | End: 2022-06-08

## 2022-06-08 RX ORDER — AMOXICILLIN 250 MG
2 CAPSULE ORAL 2 TIMES DAILY
Status: DISCONTINUED | OUTPATIENT
Start: 2022-06-08 | End: 2022-06-09 | Stop reason: HOSPADM

## 2022-06-08 RX ORDER — ONDANSETRON 2 MG/ML
4 INJECTION INTRAMUSCULAR; INTRAVENOUS EVERY 6 HOURS PRN
Status: DISCONTINUED | OUTPATIENT
Start: 2022-06-08 | End: 2022-06-08

## 2022-06-08 RX ORDER — ATORVASTATIN CALCIUM 40 MG/1
40 TABLET, FILM COATED ORAL AT BEDTIME
Status: DISCONTINUED | OUTPATIENT
Start: 2022-06-08 | End: 2022-06-09 | Stop reason: HOSPADM

## 2022-06-08 RX ORDER — IBUPROFEN 600 MG/1
600 TABLET, FILM COATED ORAL EVERY 6 HOURS
Status: DISCONTINUED | OUTPATIENT
Start: 2022-06-14 | End: 2022-06-09

## 2022-06-08 RX ORDER — METHOCARBAMOL 500 MG/1
500 TABLET, FILM COATED ORAL EVERY 6 HOURS PRN
Status: DISCONTINUED | OUTPATIENT
Start: 2022-06-08 | End: 2022-06-09 | Stop reason: HOSPADM

## 2022-06-08 RX ORDER — BISACODYL 10 MG
10 SUPPOSITORY, RECTAL RECTAL DAILY
Status: DISCONTINUED | OUTPATIENT
Start: 2022-06-09 | End: 2022-06-09 | Stop reason: HOSPADM

## 2022-06-08 RX ORDER — OXYCODONE HYDROCHLORIDE 5 MG/1
5 TABLET ORAL EVERY 4 HOURS PRN
Status: DISCONTINUED | OUTPATIENT
Start: 2022-06-08 | End: 2022-06-08

## 2022-06-08 RX ORDER — OXYCODONE HYDROCHLORIDE 10 MG/1
10 TABLET ORAL EVERY 4 HOURS PRN
Status: DISCONTINUED | OUTPATIENT
Start: 2022-06-08 | End: 2022-06-09

## 2022-06-08 RX ORDER — ALBUTEROL SULFATE 90 UG/1
2 AEROSOL, METERED RESPIRATORY (INHALATION) EVERY 4 HOURS PRN
Status: DISCONTINUED | OUTPATIENT
Start: 2022-06-08 | End: 2022-06-09 | Stop reason: HOSPADM

## 2022-06-08 RX ORDER — LISINOPRIL 10 MG/1
10 TABLET ORAL DAILY
Status: DISCONTINUED | OUTPATIENT
Start: 2022-06-08 | End: 2022-06-09 | Stop reason: HOSPADM

## 2022-06-08 RX ORDER — ACETAMINOPHEN 325 MG/1
650 TABLET ORAL EVERY 6 HOURS PRN
Qty: 50 TABLET | Refills: 0 | Status: SHIPPED | OUTPATIENT
Start: 2022-06-08 | End: 2023-01-09

## 2022-06-08 RX ORDER — SODIUM CHLORIDE, SODIUM LACTATE, POTASSIUM CHLORIDE, CALCIUM CHLORIDE 600; 310; 30; 20 MG/100ML; MG/100ML; MG/100ML; MG/100ML
INJECTION, SOLUTION INTRAVENOUS CONTINUOUS
Status: DISCONTINUED | OUTPATIENT
Start: 2022-06-08 | End: 2022-06-08

## 2022-06-08 RX ORDER — AMOXICILLIN 250 MG
1 CAPSULE ORAL 2 TIMES DAILY
Status: DISCONTINUED | OUTPATIENT
Start: 2022-06-08 | End: 2022-06-08

## 2022-06-08 RX ORDER — HYDRALAZINE HYDROCHLORIDE 20 MG/ML
2.5-5 INJECTION INTRAMUSCULAR; INTRAVENOUS EVERY 10 MIN PRN
Status: DISCONTINUED | OUTPATIENT
Start: 2022-06-08 | End: 2022-06-08 | Stop reason: HOSPADM

## 2022-06-08 RX ORDER — POLYETHYLENE GLYCOL 3350 17 G/17G
17 POWDER, FOR SOLUTION ORAL DAILY PRN
Status: DISCONTINUED | OUTPATIENT
Start: 2022-06-09 | End: 2022-06-08

## 2022-06-08 RX ORDER — LABETALOL HYDROCHLORIDE 5 MG/ML
10 INJECTION, SOLUTION INTRAVENOUS
Status: DISCONTINUED | OUTPATIENT
Start: 2022-06-08 | End: 2022-06-08 | Stop reason: HOSPADM

## 2022-06-08 RX ORDER — ALBUTEROL SULFATE 0.83 MG/ML
2.5 SOLUTION RESPIRATORY (INHALATION) EVERY 4 HOURS PRN
Status: DISCONTINUED | OUTPATIENT
Start: 2022-06-08 | End: 2022-06-08 | Stop reason: HOSPADM

## 2022-06-08 RX ORDER — ONDANSETRON 4 MG/1
4 TABLET, ORALLY DISINTEGRATING ORAL EVERY 6 HOURS PRN
Status: DISCONTINUED | OUTPATIENT
Start: 2022-06-08 | End: 2022-06-08

## 2022-06-08 RX ORDER — SODIUM CHLORIDE, SODIUM LACTATE, POTASSIUM CHLORIDE, CALCIUM CHLORIDE 600; 310; 30; 20 MG/100ML; MG/100ML; MG/100ML; MG/100ML
INJECTION, SOLUTION INTRAVENOUS CONTINUOUS
Status: DISCONTINUED | OUTPATIENT
Start: 2022-06-08 | End: 2022-06-08 | Stop reason: HOSPADM

## 2022-06-08 RX ORDER — AMOXICILLIN 250 MG
1 CAPSULE ORAL 2 TIMES DAILY
Status: DISCONTINUED | OUTPATIENT
Start: 2022-06-08 | End: 2022-06-09 | Stop reason: HOSPADM

## 2022-06-08 RX ORDER — HYDROMORPHONE HCL IN WATER/PF 6 MG/30 ML
0.2 PATIENT CONTROLLED ANALGESIA SYRINGE INTRAVENOUS
Status: DISCONTINUED | OUTPATIENT
Start: 2022-06-08 | End: 2022-06-09 | Stop reason: HOSPADM

## 2022-06-08 RX ORDER — SODIUM CHLORIDE, SODIUM LACTATE, POTASSIUM CHLORIDE, CALCIUM CHLORIDE 600; 310; 30; 20 MG/100ML; MG/100ML; MG/100ML; MG/100ML
INJECTION, SOLUTION INTRAVENOUS CONTINUOUS PRN
Status: DISCONTINUED | OUTPATIENT
Start: 2022-06-08 | End: 2022-06-08

## 2022-06-08 RX ORDER — ONDANSETRON 2 MG/ML
INJECTION INTRAMUSCULAR; INTRAVENOUS PRN
Status: DISCONTINUED | OUTPATIENT
Start: 2022-06-08 | End: 2022-06-08

## 2022-06-08 RX ORDER — LIDOCAINE 40 MG/G
CREAM TOPICAL
Status: DISCONTINUED | OUTPATIENT
Start: 2022-06-08 | End: 2022-06-08 | Stop reason: HOSPADM

## 2022-06-08 RX ORDER — DEXAMETHASONE SODIUM PHOSPHATE 4 MG/ML
INJECTION, SOLUTION INTRA-ARTICULAR; INTRALESIONAL; INTRAMUSCULAR; INTRAVENOUS; SOFT TISSUE PRN
Status: DISCONTINUED | OUTPATIENT
Start: 2022-06-08 | End: 2022-06-08

## 2022-06-08 RX ORDER — PHENAZOPYRIDINE HYDROCHLORIDE 200 MG/1
200 TABLET, FILM COATED ORAL ONCE
Status: COMPLETED | OUTPATIENT
Start: 2022-06-08 | End: 2022-06-08

## 2022-06-08 RX ORDER — ACETAMINOPHEN 325 MG/1
650 TABLET ORAL EVERY 6 HOURS
Status: DISCONTINUED | OUTPATIENT
Start: 2022-06-12 | End: 2022-06-09 | Stop reason: HOSPADM

## 2022-06-08 RX ORDER — BISACODYL 10 MG
10 SUPPOSITORY, RECTAL RECTAL DAILY PRN
Status: DISCONTINUED | OUTPATIENT
Start: 2022-06-08 | End: 2022-06-08

## 2022-06-08 RX ORDER — NALOXONE HYDROCHLORIDE 0.4 MG/ML
0.2 INJECTION, SOLUTION INTRAMUSCULAR; INTRAVENOUS; SUBCUTANEOUS
Status: DISCONTINUED | OUTPATIENT
Start: 2022-06-08 | End: 2022-06-09 | Stop reason: HOSPADM

## 2022-06-08 RX ORDER — IBUPROFEN 400 MG/1
800 TABLET, FILM COATED ORAL EVERY 6 HOURS
Status: DISCONTINUED | OUTPATIENT
Start: 2022-06-09 | End: 2022-06-09 | Stop reason: HOSPADM

## 2022-06-08 RX ORDER — LIDOCAINE HYDROCHLORIDE 20 MG/ML
INJECTION, SOLUTION INFILTRATION; PERINEURAL PRN
Status: DISCONTINUED | OUTPATIENT
Start: 2022-06-08 | End: 2022-06-08

## 2022-06-08 RX ORDER — HYDROMORPHONE HCL IN WATER/PF 6 MG/30 ML
0.4 PATIENT CONTROLLED ANALGESIA SYRINGE INTRAVENOUS
Status: DISCONTINUED | OUTPATIENT
Start: 2022-06-08 | End: 2022-06-09 | Stop reason: HOSPADM

## 2022-06-08 RX ORDER — SIMETHICONE 80 MG
80 TABLET,CHEWABLE ORAL 4 TIMES DAILY PRN
Status: DISCONTINUED | OUTPATIENT
Start: 2022-06-08 | End: 2022-06-09 | Stop reason: HOSPADM

## 2022-06-08 RX ORDER — AMOXICILLIN 250 MG
1-2 CAPSULE ORAL 2 TIMES DAILY
Qty: 30 TABLET | Refills: 0 | Status: SHIPPED | OUTPATIENT
Start: 2022-06-08

## 2022-06-08 RX ORDER — ACETAMINOPHEN 325 MG/1
975 TABLET ORAL ONCE
Status: DISCONTINUED | OUTPATIENT
Start: 2022-06-08 | End: 2022-06-08

## 2022-06-08 RX ADMIN — Medication 10 MG: at 16:36

## 2022-06-08 RX ADMIN — FENTANYL CITRATE 50 MCG: 50 INJECTION, SOLUTION INTRAMUSCULAR; INTRAVENOUS at 15:54

## 2022-06-08 RX ADMIN — PROPOFOL 200 MG: 10 INJECTION, EMULSION INTRAVENOUS at 11:54

## 2022-06-08 RX ADMIN — FENTANYL CITRATE 100 MCG: 50 INJECTION, SOLUTION INTRAMUSCULAR; INTRAVENOUS at 11:54

## 2022-06-08 RX ADMIN — OXYCODONE HYDROCHLORIDE 5 MG: 5 TABLET ORAL at 19:15

## 2022-06-08 RX ADMIN — HYDROMORPHONE HYDROCHLORIDE 0.4 MG: 0.2 INJECTION, SOLUTION INTRAMUSCULAR; INTRAVENOUS; SUBCUTANEOUS at 19:05

## 2022-06-08 RX ADMIN — HYDROMORPHONE HYDROCHLORIDE 0.4 MG: 0.2 INJECTION, SOLUTION INTRAMUSCULAR; INTRAVENOUS; SUBCUTANEOUS at 22:00

## 2022-06-08 RX ADMIN — HYDROMORPHONE HYDROCHLORIDE 0.2 MG: 0.2 INJECTION, SOLUTION INTRAMUSCULAR; INTRAVENOUS; SUBCUTANEOUS at 16:07

## 2022-06-08 RX ADMIN — Medication 3 G: at 11:58

## 2022-06-08 RX ADMIN — PROCHLORPERAZINE EDISYLATE 10 MG: 5 INJECTION INTRAMUSCULAR; INTRAVENOUS at 19:26

## 2022-06-08 RX ADMIN — FENTANYL CITRATE 50 MCG: 50 INJECTION, SOLUTION INTRAMUSCULAR; INTRAVENOUS at 12:28

## 2022-06-08 RX ADMIN — Medication 20 MG: at 12:42

## 2022-06-08 RX ADMIN — LIDOCAINE HYDROCHLORIDE 100 MG: 20 INJECTION, SOLUTION INFILTRATION; PERINEURAL at 11:54

## 2022-06-08 RX ADMIN — PHENAZOPYRIDINE HYDROCHLORIDE 200 MG: 200 TABLET, FILM COATED ORAL at 09:31

## 2022-06-08 RX ADMIN — FENTANYL CITRATE 50 MCG: 50 INJECTION, SOLUTION INTRAMUSCULAR; INTRAVENOUS at 12:26

## 2022-06-08 RX ADMIN — MIDAZOLAM 2 MG: 1 INJECTION INTRAMUSCULAR; INTRAVENOUS at 11:49

## 2022-06-08 RX ADMIN — ACETAMINOPHEN 975 MG: 325 TABLET, FILM COATED ORAL at 16:15

## 2022-06-08 RX ADMIN — Medication 50 MG: at 11:55

## 2022-06-08 RX ADMIN — Medication 30 MG: at 13:34

## 2022-06-08 RX ADMIN — LISINOPRIL 10 MG: 10 TABLET ORAL at 21:59

## 2022-06-08 RX ADMIN — METRONIDAZOLE 500 MG: 500 INJECTION, SOLUTION INTRAVENOUS at 11:45

## 2022-06-08 RX ADMIN — HEPARIN SODIUM 5000 UNITS: 5000 INJECTION, SOLUTION INTRAVENOUS; SUBCUTANEOUS at 09:32

## 2022-06-08 RX ADMIN — OXYCODONE HYDROCHLORIDE 5 MG: 5 TABLET ORAL at 16:15

## 2022-06-08 RX ADMIN — KETOROLAC TROMETHAMINE 15 MG: 30 INJECTION, SOLUTION INTRAMUSCULAR at 19:15

## 2022-06-08 RX ADMIN — ACETAMINOPHEN 975 MG: 325 TABLET ORAL at 09:30

## 2022-06-08 RX ADMIN — FENTANYL CITRATE 50 MCG: 50 INJECTION, SOLUTION INTRAMUSCULAR; INTRAVENOUS at 15:25

## 2022-06-08 RX ADMIN — Medication 30 MG: at 12:27

## 2022-06-08 RX ADMIN — DEXAMETHASONE SODIUM PHOSPHATE 4 MG: 4 INJECTION, SOLUTION INTRA-ARTICULAR; INTRALESIONAL; INTRAMUSCULAR; INTRAVENOUS; SOFT TISSUE at 11:54

## 2022-06-08 RX ADMIN — ONDANSETRON 4 MG: 2 INJECTION INTRAMUSCULAR; INTRAVENOUS at 14:32

## 2022-06-08 RX ADMIN — FENTANYL CITRATE 50 MCG: 50 INJECTION, SOLUTION INTRAMUSCULAR; INTRAVENOUS at 15:44

## 2022-06-08 RX ADMIN — ATORVASTATIN CALCIUM 40 MG: 40 TABLET, FILM COATED ORAL at 22:00

## 2022-06-08 RX ADMIN — SODIUM CHLORIDE, POTASSIUM CHLORIDE, SODIUM LACTATE AND CALCIUM CHLORIDE: 600; 310; 30; 20 INJECTION, SOLUTION INTRAVENOUS at 19:28

## 2022-06-08 RX ADMIN — SUGAMMADEX 200 MG: 100 INJECTION, SOLUTION INTRAVENOUS at 14:44

## 2022-06-08 RX ADMIN — HYDROMORPHONE HYDROCHLORIDE 0.2 MG: 0.2 INJECTION, SOLUTION INTRAMUSCULAR; INTRAVENOUS; SUBCUTANEOUS at 16:13

## 2022-06-08 RX ADMIN — HYDROMORPHONE HYDROCHLORIDE 0.5 MG: 1 INJECTION, SOLUTION INTRAMUSCULAR; INTRAVENOUS; SUBCUTANEOUS at 14:24

## 2022-06-08 RX ADMIN — SODIUM CHLORIDE, POTASSIUM CHLORIDE, SODIUM LACTATE AND CALCIUM CHLORIDE: 600; 310; 30; 20 INJECTION, SOLUTION INTRAVENOUS at 11:45

## 2022-06-08 RX ADMIN — FLUTICASONE FUROATE AND VILANTEROL TRIFENATATE 1 PUFF: 100; 25 POWDER RESPIRATORY (INHALATION) at 21:59

## 2022-06-08 RX ADMIN — HYDROMORPHONE HYDROCHLORIDE 0.2 MG: 0.2 INJECTION, SOLUTION INTRAMUSCULAR; INTRAVENOUS; SUBCUTANEOUS at 16:21

## 2022-06-08 RX ADMIN — FENTANYL CITRATE 50 MCG: 50 INJECTION, SOLUTION INTRAMUSCULAR; INTRAVENOUS at 12:29

## 2022-06-08 RX ADMIN — ONDANSETRON 4 MG: 2 INJECTION INTRAMUSCULAR; INTRAVENOUS at 16:55

## 2022-06-08 RX ADMIN — DOCUSATE SODIUM 50 MG AND SENNOSIDES 8.6 MG 1 TABLET: 8.6; 5 TABLET, FILM COATED ORAL at 21:59

## 2022-06-08 RX ADMIN — FENTANYL CITRATE 50 MCG: 50 INJECTION, SOLUTION INTRAMUSCULAR; INTRAVENOUS at 16:01

## 2022-06-08 ASSESSMENT — ACTIVITIES OF DAILY LIVING (ADL)
ADLS_ACUITY_SCORE: 20
ADLS_ACUITY_SCORE: 20
ADLS_ACUITY_SCORE: 35
ADLS_ACUITY_SCORE: 20
ADLS_ACUITY_SCORE: 20

## 2022-06-08 NOTE — PROVIDER NOTIFICATION
Time of notification: 3:38 PM  Provider notified: OB Resident  Patient status: PACU      Temp:  [97.3  F (36.3  C)-97.9  F (36.6  C)] 97.3  F (36.3  C)  Pulse:  [70-81] 70  Resp:  [12-20] 15  BP: (141-160)/(88-95) 146/88  Cuff Mean (mmHg):  [111] 111  SpO2:  [94 %-100 %] 94 %    Paged resident regarding plans of home vs. Admission - resident confirmed patient will discharge home today.    Marisela Pickard RN on 6/8/2022 at 3:39 PM

## 2022-06-08 NOTE — ANESTHESIA PROCEDURE NOTES
Airway       Patient location during procedure: OR       Procedure Start/Stop Times: 6/8/2022 11:57 AM  Staff -        CRNA: Jackie Hargrove APRN CRNA       Performed By: CRNA  Consent for Airway        Urgency: elective  Indications and Patient Condition       Indications for airway management: heena-procedural       Induction type:intravenous       Mask difficulty assessment: 1 - vent by mask    Final Airway Details       Final airway type: endotracheal airway       Successful airway: ETT - single  Endotracheal Airway Details        ETT size (mm): 7.5       Cuffed: yes       Successful intubation technique: direct laryngoscopy       DL Blade Type: Sykes 2       Grade View of Cords: 1       Adjucts: stylet       Position: Right       Measured from: lips       Secured at (cm): 23       Bite block used: None    Post intubation assessment        Placement verified by: capnometry, equal breath sounds and chest rise        Number of attempts at approach: 1       Secured with: pink tape       Ease of procedure: easy       Dentition: Intact and Unchanged    Medication(s) Administered   Medication Administration Time: 6/8/2022 11:57 AM    Additional Comments       Patient ramped with 4 blankets.

## 2022-06-08 NOTE — DISCHARGE INSTRUCTIONS
[facility]  Same-Day Surgery   Adult Discharge Orders & Instructions     For 24 hours after surgery    Get plenty of rest.  A responsible adult must stay with you for at least 24 hours after you leave the hospital.   Do not drive or use heavy equipment.  If you have weakness or tingling, don't drive or use heavy equipment until this feeling goes away.  Do not drink alcohol.  Avoid strenuous or risky activities.  Ask for help when climbing stairs.   You may feel lightheaded.  IF so, sit for a few minutes before standing.  Have someone help you get up.   If you have nausea (feel sick to your stomach): Drink only clear liquids such as apple juice, ginger ale, broth or 7-Up.  Rest may also help.  Be sure to drink enough fluids.  Move to a regular diet as you feel able.  You may have a slight fever. Call the doctor if your fever is over 100 F (37.7 C) (taken under the tongue) or lasts longer than 24 hours.  You may have a dry mouth, a sore throat, muscle aches or trouble sleeping.  These should go away after 24 hours.  Do not make important or legal decisions.   Call your doctor for any of the followin.  Signs of infection (fever, growing tenderness at the surgery site, a large amount of drainage or bleeding, severe pain, foul-smelling drainage, redness, swelling).    2. It has been over 8 to 10 hours since surgery and you are still not able to urinate (pass water).    3.  Headache for over 24 hours.    To contact a doctor, call Dr Harmon at [phone] at the Women's Health/Gynecologic Clinic or:    '   [phone] and ask for the resident on call for   Gynecology (answered 24 hours a day)  '   Emergency Department:    [facility]: [phone]       (TTY for hearing impaired: [phone])

## 2022-06-08 NOTE — BRIEF OP NOTE
[facility]    Brief Operative Note    Pre-operative diagnosis: Endometrial cancer (H) [C54.1]  Post-operative diagnosis Same as pre-operative diagnosis - Frozen c/w Grade I Endometrioid Adenocarcinoma     Procedure: Procedure(s):  Laparoscopy, removal of uterus, tubes, ovaries, cervix, bilateral sentinel  lymph nodes dissection  look into bladder (cystoscopy), rectal insuffation test  Surgeon: Surgeon(s) and Role:     * Kayden Harmon MD - Primary     * Aliyah Henao MD - Resident - Assisting     * Donald Ardon MD - Fellow - Assisting  Anesthesia: General   Estimated Blood Loss: 100cc    Drains: None  Specimens:   ID Type Source Tests Collected by Time Destination   1 : pelvic washings Washings Pelvis NON-GYNECOLOGIC CYTOLOGY Kayden Harmon MD 6/8/2022 12:46 PM    2 : right pelvic sentinel lymph node Tissue Lymph Node(s), [place] SURGICAL PATHOLOGY EXAM Kayden Harmon MD 6/8/2022 12:46 PM    3 : left pelvic sentinel lymph node Tissue Lymph Node(s), [place] SURGICAL PATHOLOGY EXAM Kayden Harmon MD 6/8/2022 12:51 PM    4 : uterus, cervix, bilateral ovaries and fallopian tubes Tissue Uterus, Cervix, Bilateral Fallopian Tubes & Ovaries SURGICAL PATHOLOGY EXAM, RESEARCH SPECIMEN FOR BIONET TESTING (Canceled) Kayden Harmon MD 6/8/2022  1:42 PM      Findings:   Exam under anesthesia with closed cervix without cervicovaginal pathology. Anteverted uterus. Diagnostic laparoscopy with normal appearing upper abdominal structures, including liver margin, diaphragm, stomach and bowels. Enlarged appearing uterus approximately 10cm in fundal height with normal appearing bilateral ovaries and fallopian tubes. No significant intraabdominal adhesive disease. During closure of vaginal cuff, concern arose over inadvertend serosal injury with graspers to the rectum - no intraabdominal air leak with proctoscopic evaluation.  Ureters noted to vermiculate bilaterally, both intrraabdominally and on cystoscopy. All surgical sites hemostatic at end of case.  .  Complications: None.  Implants: * No implants in log *    Aliyah Henao MD PGY2  [facility] Gynecology Oncology   Gyn Onc Pager: [phone]  06/08/22 2:56 PM

## 2022-06-08 NOTE — OR NURSING
Dr. Henao at bedside assessing pain. Pt's pain is 9/10, pt states she is unable to move with this much pain. Plan is to admit pt for pain control.

## 2022-06-08 NOTE — ANESTHESIA PREPROCEDURE EVALUATION
Anesthesia Pre-Procedure Evaluation    Patient: Indigo Gunter   MRN: [id number] : 1980        Procedure : Procedure(s):  Laparoscopy, removal of uterus, tubes, ovaries, cervix, lymph nodes  Possible open surgery, possible cancer operation  look into bladder (cystoscopy)          Past Medical History:   Diagnosis Date     HTN (hypertension)      Obese      Prediabetes      Renal failure     grade 1 - minimal      History reviewed. No pertinent surgical history.   No Known Allergies   Social History     Tobacco Use     Smoking status: Never Smoker     Smokeless tobacco: Never Used   Substance Use Topics     Alcohol use: Never      Wt Readings from Last 1 Encounters:   22 (!) 158.4 kg (349 lb 3.3 oz)        Anesthesia Evaluation   Pt has had prior anesthetic.     No history of anesthetic complications       ROS/MED HX  ENT/Pulmonary:     (+) ANNE risk factors, hypertension, obese, Mild Persistent, asthma Treatment: Inhaled steroids and Inhaler prn,      Neurologic:  - neg neurologic ROS     Cardiovascular:     (+) hypertension-----    METS/Exercise Tolerance: >4 METS    Hematologic:     (+) anemia,     Musculoskeletal:       GI/Hepatic:       Renal/Genitourinary: Comment: History of renal disease, normal creatinine      Endo:     (+) Obesity,     Psychiatric/Substance Use:  - neg psychiatric ROS     Infectious Disease:       Malignancy: Comment: Endometrial CA  (+) Malignancy,     Other:     (-) Any chance pregnant       Physical Exam    Airway        Mallampati: III   TM distance: > 3 FB   Neck ROM: full   Mouth opening: > 3 cm    Respiratory Devices and Support         Dental  no notable dental history         Cardiovascular          Rhythm and rate: regular and normal     Pulmonary           breath sounds clear to auscultation           OUTSIDE LABS:  CBC:   Lab Results   Component Value Date    WBC 11.5 (H) 2022    HGB 11.1 (L) 2022    HCT 37.8 2022     2022     BMP:    Lab Results   Component Value Date     05/16/2022    POTASSIUM 4.1 05/16/2022    CHLORIDE 107 05/16/2022    CO2 26 05/16/2022    BUN 13 05/16/2022    CR 0.70 05/16/2022     (H) 06/08/2022     (H) 05/16/2022     COAGS: No results found for: PTT, INR, FIBR  POC:   Lab Results   Component Value Date    HCG Negative 06/08/2022     HEPATIC:   Lab Results   Component Value Date    ALBUMIN 3.8 05/16/2022    PROTTOTAL 8.1 05/16/2022    ALT 26 05/16/2022    AST 16 05/16/2022    ALKPHOS 119 05/16/2022    BILITOTAL 0.3 05/16/2022     OTHER:   Lab Results   Component Value Date    FRANCIS 9.4 05/16/2022       Anesthesia Plan    ASA Status:  2   NPO Status:  NPO Appropriate    Anesthesia Type: General.     - Airway: ETT   Induction: Intravenous.   Maintenance: Balanced.        Consents    Anesthesia Plan(s) and associated risks, benefits, and realistic alternatives discussed. Questions answered and patient/representative(s) expressed understanding.    - Discussed:     - Discussed with:  Patient    Use of blood products discussed: Yes.     - Discussed with: Patient.     - Consented: consented to blood products            Reason for refusal: other.     Postoperative Care    Pain management: Multi-modal analgesia.   PONV prophylaxis: Ondansetron (or other 5HT-3), Dexamethasone or Solumedrol     Comments:    Other Comments: - ASA 2  - GETA with standard ASA monitors, IV induction, balanced anesthetic  - PIV  - Antibiotics per surgery  - PONV prophylaxis  - Pain management with Fentanyl PRN, tylenol  - Relevant risks, benefits, alternatives and the anesthetic plan were discussed with patient/family or family representative. All questions were answered and there was agreement to proceed.              Belinda Rodriguez MD

## 2022-06-08 NOTE — PROVIDER NOTIFICATION
Time of notification: 4:29 PM  Provider notified: OB resident  Patient status:  Orders received:     Temp:  [97.3  F (36.3  C)-97.9  F (36.6  C)] 97.9  F (36.6  C)  Pulse:  [68-81] 74  Resp:  [12-20] 19  BP: (138-160)/(88-95) 148/90  Cuff Mean (mmHg):  [111] 111  SpO2:  [94 %-100 %] 96 %    Pt. RICARDO Gunter still having severe pain in PACU; could you evaluate? Zoraida RN *[phone]    Marisela Pickard RN on 6/8/2022 at 4:30 PM

## 2022-06-09 VITALS
DIASTOLIC BLOOD PRESSURE: 61 MMHG | RESPIRATION RATE: 18 BRPM | WEIGHT: 293 LBS | BODY MASS INDEX: 45.99 KG/M2 | SYSTOLIC BLOOD PRESSURE: 116 MMHG | HEART RATE: 84 BPM | HEIGHT: 67 IN | TEMPERATURE: 98.1 F | OXYGEN SATURATION: 95 %

## 2022-06-09 LAB
ANION GAP SERPL CALCULATED.3IONS-SCNC: 7 MMOL/L (ref 3–14)
BUN SERPL-MCNC: 9 MG/DL (ref 7–30)
CALCIUM SERPL-MCNC: 9 MG/DL (ref 8.5–10.1)
CHLORIDE BLD-SCNC: 106 MMOL/L (ref 94–109)
CO2 SERPL-SCNC: 23 MMOL/L (ref 20–32)
CREAT SERPL-MCNC: 0.61 MG/DL (ref 0.52–1.04)
ERYTHROCYTE [DISTWIDTH] IN BLOOD BY AUTOMATED COUNT: 15.6 % (ref 10–15)
GFR SERPL CREATININE-BSD FRML MDRD: >90 ML/MIN/1.73M2
GLUCOSE BLD-MCNC: 122 MG/DL (ref 70–99)
GLUCOSE BLDC GLUCOMTR-MCNC: 146 MG/DL (ref 70–99)
HCT VFR BLD AUTO: 32.8 % (ref 35–47)
HGB BLD-MCNC: 10.2 G/DL (ref 11.7–15.7)
HGB BLD-MCNC: 9.9 G/DL (ref 11.7–15.7)
MCH RBC QN AUTO: 22.4 PG (ref 26.5–33)
MCHC RBC AUTO-ENTMCNC: 30.2 G/DL (ref 31.5–36.5)
MCV RBC AUTO: 74 FL (ref 78–100)
PLATELET # BLD AUTO: 369 10E3/UL (ref 150–450)
POTASSIUM BLD-SCNC: 3.8 MMOL/L (ref 3.4–5.3)
RBC # BLD AUTO: 4.41 10E6/UL (ref 3.8–5.2)
SODIUM SERPL-SCNC: 136 MMOL/L (ref 133–144)
WBC # BLD AUTO: 14.5 10E3/UL (ref 4–11)

## 2022-06-09 PROCEDURE — 80048 BASIC METABOLIC PNL TOTAL CA: CPT | Performed by: STUDENT IN AN ORGANIZED HEALTH CARE EDUCATION/TRAINING PROGRAM

## 2022-06-09 PROCEDURE — 99024 POSTOP FOLLOW-UP VISIT: CPT | Performed by: OBSTETRICS & GYNECOLOGY

## 2022-06-09 PROCEDURE — 36415 COLL VENOUS BLD VENIPUNCTURE: CPT | Performed by: NURSE PRACTITIONER

## 2022-06-09 PROCEDURE — 85027 COMPLETE CBC AUTOMATED: CPT | Performed by: STUDENT IN AN ORGANIZED HEALTH CARE EDUCATION/TRAINING PROGRAM

## 2022-06-09 PROCEDURE — 85018 HEMOGLOBIN: CPT | Performed by: NURSE PRACTITIONER

## 2022-06-09 PROCEDURE — 250N000013 HC RX MED GY IP 250 OP 250 PS 637: Performed by: STUDENT IN AN ORGANIZED HEALTH CARE EDUCATION/TRAINING PROGRAM

## 2022-06-09 PROCEDURE — 250N000013 HC RX MED GY IP 250 OP 250 PS 637

## 2022-06-09 PROCEDURE — 36415 COLL VENOUS BLD VENIPUNCTURE: CPT | Performed by: STUDENT IN AN ORGANIZED HEALTH CARE EDUCATION/TRAINING PROGRAM

## 2022-06-09 RX ORDER — OXYCODONE HYDROCHLORIDE 5 MG/1
5 TABLET ORAL EVERY 4 HOURS
Status: DISCONTINUED | OUTPATIENT
Start: 2022-06-09 | End: 2022-06-09 | Stop reason: HOSPADM

## 2022-06-09 RX ORDER — OXYCODONE HYDROCHLORIDE 5 MG/1
5 TABLET ORAL EVERY 6 HOURS PRN
Qty: 6 TABLET | Refills: 0 | Status: SHIPPED | OUTPATIENT
Start: 2022-06-09 | End: 2022-06-12

## 2022-06-09 RX ORDER — OXYCODONE HYDROCHLORIDE 10 MG/1
10 TABLET ORAL EVERY 4 HOURS PRN
Status: DISCONTINUED | OUTPATIENT
Start: 2022-06-09 | End: 2022-06-09 | Stop reason: HOSPADM

## 2022-06-09 RX ORDER — OXYCODONE HYDROCHLORIDE 5 MG/1
5 TABLET ORAL
Status: DISCONTINUED | OUTPATIENT
Start: 2022-06-09 | End: 2022-06-09

## 2022-06-09 RX ORDER — OXYCODONE HYDROCHLORIDE 10 MG/1
10 TABLET ORAL
Status: DISCONTINUED | OUTPATIENT
Start: 2022-06-09 | End: 2022-06-09

## 2022-06-09 RX ORDER — OXYCODONE HYDROCHLORIDE 5 MG/1
5 TABLET ORAL EVERY 4 HOURS PRN
Status: DISCONTINUED | OUTPATIENT
Start: 2022-06-09 | End: 2022-06-09

## 2022-06-09 RX ADMIN — ACETAMINOPHEN 975 MG: 325 TABLET ORAL at 02:34

## 2022-06-09 RX ADMIN — ACETAMINOPHEN 975 MG: 325 TABLET ORAL at 09:25

## 2022-06-09 RX ADMIN — FLUTICASONE FUROATE AND VILANTEROL TRIFENATATE 1 PUFF: 100; 25 POWDER RESPIRATORY (INHALATION) at 09:26

## 2022-06-09 RX ADMIN — SIMETHICONE 80 MG: 80 TABLET, CHEWABLE ORAL at 09:30

## 2022-06-09 RX ADMIN — IBUPROFEN 800 MG: 400 TABLET, FILM COATED ORAL at 13:32

## 2022-06-09 RX ADMIN — LISINOPRIL 10 MG: 10 TABLET ORAL at 09:25

## 2022-06-09 RX ADMIN — DOCUSATE SODIUM 50 MG AND SENNOSIDES 8.6 MG 1 TABLET: 8.6; 5 TABLET, FILM COATED ORAL at 09:25

## 2022-06-09 RX ADMIN — OXYCODONE HYDROCHLORIDE 5 MG: 5 TABLET ORAL at 09:45

## 2022-06-09 RX ADMIN — DOCUSATE SODIUM 50 MG AND SENNOSIDES 8.6 MG 1 TABLET: 8.6; 5 TABLET, FILM COATED ORAL at 09:47

## 2022-06-09 RX ADMIN — OXYCODONE HYDROCHLORIDE 5 MG: 5 TABLET ORAL at 13:32

## 2022-06-09 RX ADMIN — IBUPROFEN 800 MG: 400 TABLET, FILM COATED ORAL at 02:34

## 2022-06-09 ASSESSMENT — ACTIVITIES OF DAILY LIVING (ADL)
ADLS_ACUITY_SCORE: 27

## 2022-06-09 NOTE — PROGRESS NOTES
Gynecologic Oncology Progress Note    S:  Feeling ok this morning. Pain is still a 5. Has only drank water. Had nothing to eat. Not really hungry. Has not been out of bed since PACU, has not voided since then either. Denies F/C, CP, SOB.    O:  Vitals:    06/08/22 2327 06/09/22 0141 06/09/22 0500 06/09/22 0542   BP:  112/63  108/57   BP Location:  Left arm  Left arm   Patient Position:    Supine   Pulse:  72  77   Resp:  17  17   Temp:  97.2  F (36.2  C)  98.8  F (37.1  C)   TempSrc:  Oral  Oral   SpO2: 95%  95% 94%   Weight:       Height:         Gen: comfortable appearing.   Cardio: RRR  Resp: NWOB  Abdomen: soft, non-distended. ttp centrally. Incisions sites c/d/i x4   Extremities: Non-tender, trace edema    I/O:    Intake/Output Summary (Last 24 hours) at 6/9/2022 0605  Last data filed at 6/8/2022 1700  Gross per 24 hour   Intake 1200 ml   Output 300 ml   Net 900 ml     Assessment:   Indigo Gunter is a 42 year old POD#1 Lsc TLH, BSO, sLN who was admitted overnight for poor pain control. Pain is improved this morning. She still slow to postop however and needs to eat, ambulate more, and void again as she has not done this since last night.    Dz: PMB - frozen c/w Grade I Endometrial Adenocarcinoma    FEN: Reg diet. LR 50cc/h > to stop this morning  Pain: s/p Ketamine, dilaudid, oxycodone postoperatively. Pain better controlled on current regimen of Pedro Pablo Tylenol, ibuprofen; prn oxy 5-10, robaxin, and prn dilaudid  Heme: Hgb 11.1> EBL 100cc> 11.4 > AM pending  CV: HTN- lisinopril, Obesity, HLD-Lipitor  Pulm: Mild Persistent Asthma- albuterol, Advair  GI: Pedro Pablo bowel regimen, prn antiemetics   : s/p rodrigez, voiding spontaneously, but has not done so since yesterdy  ID: s/p Ancef, Flagyl preop  Endo: NI  Psych/Neuro/MSK: NI  PPX: SCDs, IS  Dispo: discharge POD#1 pending pain improvement   Drains/Lines: PIVx1      Lb Lopez MD MPH  OB/Gyn PGY-3  06/09/22 6:38 AM  Gyn Onc Pager: [phone]    Ron SIEGEL  MD Miguel personally examined and evaluated this patient on 06/09/22.  I discussed the patient with the resident and care team, and agree with the assessment and plan of care as documented in the residents note above.    I personally reviewed vital signs, laboratory values and imaging results.    Pt stayed overnight for pain control.  Pain now controlled.  Home today    Abi Rivera MD  Gynecologic Oncology  [facility]

## 2022-06-09 NOTE — DISCHARGE SUMMARY
Gynecologic Oncology Discharge Summary    Indigo Gunter  [id number]    Admit Date: 6/8/2022  Discharge Date: 6/9/22  Admitting Provider: Kayden Harmon MD  Discharge Provider: Abi Rivera MD    Admission Dx:   - Grade 1 endometrial adenocarcinoma  - Hypertension  - Obesity  - Hyperlipidemia     Discharge Dx:  - Same   - Grade 1 endometrial adenocarcinoma  - Hypertension  - Obesity  - Hyperlipidemia       Patient Active Problem List   Diagnosis    Endometrial cancer (H)       Procedures: Laparoscopy, removal of uterus, tubes, ovaries, cervix, bilateral sentinel  lymph nodes dissection  look into bladder (cystoscopy), rectal insuffation test    Prior to Admission Medications:  Medications Prior to Admission   Medication Sig Dispense Refill Last Dose    albuterol (PROAIR HFA/PROVENTIL HFA/VENTOLIN HFA) 108 (90 Base) MCG/ACT inhaler Inhale 2 puffs into the lungs   More than a month at Unknown time    aspirin (ASA) 81 MG chewable tablet CHEW AND SWALLOW ONE TABLET BY MOUTH ONCE DAILY   Past Week at Unknown time    atorvastatin (LIPITOR) 40 MG tablet Take 40 mg by mouth   6/7/2022 at Unknown time    fluticasone-salmeterol (ADVAIR) 250-50 MCG/DOSE inhaler INHALE 1 PUFF BY MOUTH TWO TIMES A DAY   6/8/2022 at Unknown time    lisinopril (ZESTRIL) 10 MG tablet Take 10 mg by mouth   Past Week at Unknown time    Zinc 30 MG TABS Take 30 mg by mouth daily   Past Week at Unknown time    [DISCONTINUED] medroxyPROGESTERone (PROVERA) 10 MG tablet Take 10 mg by mouth daily   6/7/2022 at Unknown time       Discharge Medications:  Current Discharge Medication List        START taking these medications    Details   acetaminophen (TYLENOL) 325 MG tablet Take 2 tablets (650 mg) by mouth every 6 hours as needed for mild pain  Qty: 50 tablet, Refills: 0    Associated Diagnoses: Endometrial cancer (H)      ibuprofen (ADVIL/MOTRIN) 800 MG tablet Take 1 tablet (800 mg) by mouth every 6 hours as needed for other (mild and/or inflammatory  pain)  Qty: 12 tablet, Refills: 0    Associated Diagnoses: Endometrial cancer (H)      oxyCODONE (ROXICODONE) 5 MG tablet Take 1 tablet (5 mg) by mouth every 6 hours as needed for pain  Qty: 6 tablet, Refills: 0    Associated Diagnoses: Endometrial cancer (H)      senna-docusate (SENOKOT-S/PERICOLACE) 8.6-50 MG tablet Take 1-2 tablets by mouth 2 times daily  Qty: 30 tablet, Refills: 0    Associated Diagnoses: Endometrial cancer (H)           CONTINUE these medications which have NOT CHANGED    Details   albuterol (PROAIR HFA/PROVENTIL HFA/VENTOLIN HFA) 108 (90 Base) MCG/ACT inhaler Inhale 2 puffs into the lungs      aspirin (ASA) 81 MG chewable tablet CHEW AND SWALLOW ONE TABLET BY MOUTH ONCE DAILY      atorvastatin (LIPITOR) 40 MG tablet Take 40 mg by mouth      fluticasone-salmeterol (ADVAIR) 250-50 MCG/DOSE inhaler INHALE 1 PUFF BY MOUTH TWO TIMES A DAY      lisinopril (ZESTRIL) 10 MG tablet Take 10 mg by mouth      Zinc 30 MG TABS Take 30 mg by mouth daily           STOP taking these medications       medroxyPROGESTERone (PROVERA) 10 MG tablet Comments:   Reason for Stopping:               Consultations:  None    Brief History of Illness:  Indigo Gunter is a 42 year old woman with endometrial cancer. Risks, benefits, and alternatives to surgery were discussed. Informed consent obtained.     Hospital Course:  Dz:   - Preoperative diagnosis was endometrial cancer.Final pathology is pending at the time of discharge.  She will follow-up postoperatively for a care plan.  FEN:   - She was maintained on IVF until POD#1, when her diet was slowly advanced.  By discharge, she was tolerating a regular diet without nausea and vomiting and able to maintain her hydration without IVF supplementation.  Pain:   - Her pain was initially controlled on a IV meds, though was suboptimally controlled in initial postop course and required overnight observation to improve. Once tolerating PO pain meds, she was transitioned to a PO  pain regimen.  Her pain was well controlled on this and she was discharged home with these medications.  CV:   - She has history of HTN.  Her vital signs were stable while in house and she had no acute CV issues.  PULM:   - She has no history of pulmonary issues.  She was initially given O2 supplementation to maintain her O2 sats in the immediate postop period and was transitioned off of this without difficulty.  By discharge, her O2 sats were greater than 94% on RA.  She was encouraged to use her bedside IS while in house.  She had no acute pulmonary issues while in house.  HEME:   - Her preoperative Hgb was 11.1.  Her hgb dropped to 9.9 postoperatively and was stable at 10.2 at the time of discharge.  She had no other acute heme issues while in house.  GI:   - She was made NPO prior to the procedure.  On POD#0, her diet was advanced to clear liquids and then advanced slowly as tolerated.  At the time of discharge, she was tolerating a regular diet without nausea and vomiting.  She will be discharged with a bowel regimen to prevent constipation in the postoperative period.  She had no acute GI issues while in house.  :    - A rodrigez catheter was placed at the time of the surgery.  Once ambulating unassisted, the rodrigez catheter was removed.  Prior to discharge, the patient was voiding spontaneously without difficulty.  She had no acute  issues while in house.  ID:   - The patient was AF during her hospitalization.  She received standard preoperative antibiotics without incident.    ENDO:   - no issues  PSYCH/NEURO:   - no issues  PPX:    -  She was given SCDs, IS during her hospital course.  She tolerated these prophylactic interventions without incident.  They were discontinued at the time of her discharge.      Discharge Instructions and Follow up:  Ms. Indigo Gunter was discharged from the hospital with follow up for postop visit.    Discharge Diet: Regular  Discharge Activity: Activity as tolerated  See  discharge instructions  Discharge Follow up: postop visit    Discharge Disposition:  Discharged to home    Discharge Staff: Miguel Wilks MD  OB/GYN PGY-4  06/09/22 7:55 AM     Abi Rivera MD  Gynecologic Oncology  [facility]

## 2022-06-09 NOTE — ANESTHESIA POSTPROCEDURE EVALUATION
Patient: Indigo Gunter    Procedure: Procedure(s):  Laparoscopy, removal of uterus, tubes, ovaries, cervix, bilateral sentinel  lymph nodes dissection  look into bladder (cystoscopy), rectal insuffation test       Anesthesia Type:  General    Note:  Disposition: Admission; Disposition Change/Cancellation            Disposition Change: Unplanned admission/ICU admission   Postop Pain Control: Challenging            Challenges/Interventions: Acute Pain; Multimodal therapy            Sign Out: ONGOING pain issues   PONV: No   Neuro/Psych: Uneventful            Sign Out: Acceptable/Baseline neuro status   Airway/Respiratory: Uneventful            Sign Out: Acceptable/Baseline resp. status   CV/Hemodynamics: Uneventful            Sign Out: Acceptable CV status; No obvious hypovolemia; No obvious fluid overload   Other NRE: NONE   DID A NON-ROUTINE EVENT OCCUR? YES    Event details/Postop Comments:  Patient being admitted for pain control.           Last vitals:  Vitals Value Taken Time   /93 06/08/22 1745   Temp 36.6  C (97.9  F) 06/08/22 1615   Pulse 88 06/08/22 1802   Resp 22 06/08/22 1802   SpO2 94 % 06/08/22 1804   Vitals shown include unvalidated device data.    Electronically Signed By: SUNNY GARCIA MD  June 8, 2022  9:30 PM

## 2022-06-09 NOTE — PLAN OF CARE
Goal Outcome Evaluation:    Plan of Care Reviewed With: patient     Overall Patient Progress: no change    Outcome Evaluation: A&Ox4, lethargic and somnolent overnight, easily arouses to voice/touch, CAPNO on, 1L O2 via NC on overnight w/ SPO2 at 93-95%, pt admitted to  for post-op pain management following total hysterectomy, PRN dilaudid given 1x w/ scheduled tylenol and ibuprofen, pt rating pain 5/10 most of shift, sleeping comfortably, skin intact, abdominal binder on, 4 abdominal surgical sites, 1 w/ scant serosanguenous drainage, pt has not been getting up OOB d/t pain, pt due to void, has not voided since PACU, call light in reach

## 2022-06-09 NOTE — PROGRESS NOTES
Gynecologic Oncology Postoperative Check Note  6/8/2022    S: Called to patient's room to assess pain control. Patient states pain remains an 8/10 despite medication intervention. Pain in a band on the lower abdomen. Does not want to ambulate due to discomfort. Thus has not voided as of yet. Denies lightheadedness or dizziness. Was nauseated immediately postoperatively, but did not have emesis.     O:  Vitals:    06/08/22 1715 06/08/22 1730 06/08/22 1812 06/08/22 1847   BP: (!) 147/90 (!) 147/92 138/89 (!) 156/90   BP Location:   Left arm    Pulse: 75 72 73 108   Resp: 17 15 22 18   Temp:   98.7  F (37.1  C)    TempSrc:   Oral    SpO2: 93% 93% 93% 95%   Weight:       Height:       Gen: Uncomfortable appearing.   Cardio: RRR, S1/S2, no murmurs  Resp: CTAB, no wheezing or crackles  Abdomen: soft, non-distended. Pan-tender to palpation.   Extremities: Non-tender, trace edema    Assessment: Indigo Gunter is a 42 year old POD#0 Lsc TLH, BSO, sLN. Patient with poorly controlled abdominal pain postop. Vitals reviewed, stable as noted above. Patient refusing to ambulate due to pain at this time. Given discomfort, admit for pain control and further evaluation as needed.     Dz: PMB - frozen c/w Grade I Endometrial Adenocarcinoma    FEN: Reg diet. LR 100cc/h  Pain: S/p Ketamine, dilaudid, oxycodone postoperatively with moderate relief. Pedro Pablo Tylenol, ibuprofen; prn oxy 5-10, robaxin   Heme: STAT CBC ordered at this time. Hgb 11.1> EBL 100cc> AM CBC pending.   CV: HTN- lisinopril, Obesity, HLD-Lipitor  Pulm: Mild Persistent Asthma- albuterol, Advair  GI: Pedro Pablo bowel regimen, prn antiemetics   : s/p rodrigez  ID: s/p Ancef, Flagyl preop  Endo: NI  Psych/Neuro/MSK: NI  PPX: SCDs, IS  Dispo: discharge POD#1 pending pain improvement   Drains/Lines: PIVx1      Dispo: Admitted for pain management. Follow up STAT CBC.  Discussed with Dr. Ardon.     Aliyah Henao MD PGY-2   [facility] Gynecology Oncology   Gyn Onc Pager:  [phone]  06/08/22 6:30 PM

## 2022-06-09 NOTE — OP NOTE
Procedure Date: 06/08/2022    PREOPERATIVE DIAGNOSIS:  Endometrial carcinoma, grade 1.    POSTOPERATIVE DIAGNOSIS:  Endometrial carcinoma, grade 1, no clear invasion.    PROCEDURE:  Total laparoscopic hysterectomy/bilateral salpingo-oophorectomy, bilateral sentinel lymph node biopsy, cystoscopy, and insufflation test.    ATTENDING SURGEON:  Kayden Harmon MD    :  Aliyah Henao MD    ANESTHESIA:  GET.    COMPLICATIONS:  None apparent.    TUBES AND DRAINS:  Include Sy (removed at the end of surgery).    ESTIMATED BLOOD LOSS:  Approximately 100 mL.    INTRAVENOUS FLUIDS:  700.    URINE OUTPUT:  200 mL    INTRAOPERATIVE FINDINGS:  Include enlarged uterus with mild filmy adhesions to the anterior bladder and the left lateral sidewall, small volume of sigmoid adhesions to the lateral aspect of the wall, but generally speaking, no significant adhesions.  The upper abdomen appeared grossly normal.  The gallbladder appeared normal.  Diaphragms appeared normal.  The ovaries appeared normal.    DESCRIPTION OF PROCEDURE:  After obtaining informed consent, the patient was brought to the operating room and placed in supine position.  She underwent induction of general anesthesia and was prepped and draped in the usual sterile fashion.  With a speculum, the cervix was identified and infiltrated with ICG.  At this point, a Sy catheter was placed sterilely, and the patient was draped.  The umbilicus was everted, incised and insufflated to 15 mmHg using a Veress needle.  A 5 mm trocar was introduced.   There was no evidence of injury, and the abdomen was explored.  The above findings were noted.  The hysterectomy was performed by initially identifying the sentinel nodes using the near infrared scanner.  These were identified and the right and left side without difficulty.  The retroperitoneum was entered by opening the round ligament and then identifying the ureter.  The ureter was reflected medially, which  allowed access to the external iliac vessels, and the sentinel nodes on the left and right side were identified in the obturator and external iliac spaces.  These were sent for permanent pathology and dissected sharply from the vessels.  No underlying injury was observed.  The attention was then turned to the remainder of the hysterectomy.  With the IP ligament and ureter identified simultaneously, the IP ligament could be isolated and transected.  That dissection was carried medially to the level of the uterine arteries, where the uterine arteries were clamped and cauterized and transected using the LigaSure.  Bladder flap was reflected inferiorly with the KOH cup and STEFANO used as a uterine manipulator after closing the fallopian tubes.  The uterine arteries were then skeletonized using the LigaSure, and the specimen was amputated at the level of vagina and delivered vaginally.  Frozen section was performed and demonstrated the above.  The vaginal cuff was closed using a series of 2-0 Vicryl sutures in an interrupted figure-of-eight fashion.  Unfortunately, during the course of this, there was a slippage, and it did appear that there was an entry into the peritoneum, and for this reason, we performed an insufflation test, though the clinical suspicion for bowel injury was low.  No bubbles were identified, and it was concluded that the bowel was safe in this case.  Cystoscopy was performed at this time as well, and this showed good urine efflux from both ureters and no evidence of injury.  At this point, decision was made to terminate the procedure.  The frozen section returned with minimal to no invasion and grade 1 endometrial cancer.  We therefore closed the 12 mm trocars using a PMI instrument,  and they were infiltrated with Marcaine.  The 5 mm and 12 mm trocars were closed at the level of the skin using Monocryl.  They were dressed, and the patient was placed back in supine position.    Kayden Harmon,  MD        D: 2022   T: 2022   MT: RODERICK    Name:     ANAID DENISE  MRN:      [id number]        Account:        [id number]   :      1980           Procedure Date: 2022     Document: [id number]

## 2022-06-09 NOTE — OR NURSING
OBGYN resident paged. Pt is still having 7/10 pain. She has received 0.4 mg dilaudid and 15 mg Ketorolac in Phase II. Requesting more pain meds.

## 2022-06-10 ENCOUNTER — PATIENT OUTREACH (OUTPATIENT)
Dept: ONCOLOGY | Facility: CLINIC | Age: 42
End: 2022-06-10

## 2022-06-10 LAB
PATH REPORT.COMMENTS IMP SPEC: NORMAL
PATH REPORT.FINAL DX SPEC: NORMAL
PATH REPORT.GROSS SPEC: NORMAL
PATH REPORT.RELEVANT HX SPEC: NORMAL

## 2022-06-13 ENCOUNTER — PATIENT OUTREACH (OUTPATIENT)
Dept: ONCOLOGY | Facility: CLINIC | Age: 42
End: 2022-06-13

## 2022-06-13 LAB
INTERPRETATION: NORMAL
SIGNIFICANT RESULTS: NORMAL
SPECIMEN DESCRIPTION: NORMAL
TEST DETAILS, MDL: NORMAL

## 2022-06-13 PROCEDURE — G0452 MOLECULAR PATHOLOGY INTERPR: HCPCS | Performed by: PATHOLOGY

## 2022-06-13 PROCEDURE — 88342 IMHCHEM/IMCYTCHM 1ST ANTB: CPT | Mod: TC | Performed by: OBSTETRICS & GYNECOLOGY

## 2022-06-13 PROCEDURE — 81403 MOPATH PROCEDURE LEVEL 4: CPT | Performed by: OBSTETRICS & GYNECOLOGY

## 2022-06-13 PROCEDURE — 88342 IMHCHEM/IMCYTCHM 1ST ANTB: CPT | Mod: 26 | Performed by: PATHOLOGY

## 2022-06-13 PROCEDURE — 81351 TP53 GENE FULL GENE SEQUENCE: CPT | Performed by: OBSTETRICS & GYNECOLOGY

## 2022-06-13 PROCEDURE — 81479 UNLISTED MOLECULAR PATHOLOGY: CPT | Performed by: OBSTETRICS & GYNECOLOGY

## 2022-06-25 NOTE — PROGRESS NOTES
LRQ inside when sitting feel burning sensation  Today feels just bruised  Lasted through the weekend  Noted mostly when moving  Ice and abdomen belt helps  This pm felt better  Pain meds were not used  Feeling better today, no further worries

## 2022-06-27 ENCOUNTER — ONCOLOGY VISIT (OUTPATIENT)
Dept: ONCOLOGY | Facility: CLINIC | Age: 42
End: 2022-06-27
Attending: OBSTETRICS & GYNECOLOGY
Payer: COMMERCIAL

## 2022-06-27 VITALS
RESPIRATION RATE: 20 BRPM | DIASTOLIC BLOOD PRESSURE: 69 MMHG | OXYGEN SATURATION: 98 % | WEIGHT: 293 LBS | HEART RATE: 84 BPM | BODY MASS INDEX: 53.41 KG/M2 | TEMPERATURE: 98.1 F | SYSTOLIC BLOOD PRESSURE: 138 MMHG

## 2022-06-27 DIAGNOSIS — C54.1 ENDOMETRIAL CANCER (H): Primary | ICD-10-CM

## 2022-06-27 PROCEDURE — G0463 HOSPITAL OUTPT CLINIC VISIT: HCPCS

## 2022-06-27 PROCEDURE — 99024 POSTOP FOLLOW-UP VISIT: CPT | Performed by: OBSTETRICS & GYNECOLOGY

## 2022-06-27 ASSESSMENT — PAIN SCALES - GENERAL: PAINLEVEL: MILD PAIN (2)

## 2022-06-27 NOTE — PROGRESS NOTES
"                        Consult Notes on Referred Patient    Date: 5/15/2022       Dr. Ratna Avina MD  No address on file       RE: Indigo Gunter  : 1980  MARY: 2022    Dear Dr. Referred Self:    I had the pleasure of seeing your patient Indigo Gunter here at the Gynecologic Cancer Clinic at the [facility] on 2022.  As you know she is a very pleasant 42 year old woman with a recent diagnosis of  EMCA G1.  Given these findings she was subsequently sent to the Gynecologic Cancer Clinic for new patient consultation.     She was recently evaluated by DR. Dumont who reported the following:    \" Indigo Gunter is a 42 Y  female who presents for surgical consultation.     The patient continues to have heavy menstrual bleeding. Due to the heavy bleeding she was having last week and, she required the use of Provera. She has not had unprotected intercourse for the past 2 weeks, and desires to proceed with Depo-Provera in the meantime until her surgery can be performed.    The patient desires to proceed with definitive management of her heavy menstrual bleeding with robotic assisted total laparoscopic hysterectomy.    The patient had previous had an IUD placed, however this was expelled. Her second IUD had been inserted. Due to heavy bleeding, she had a high index of suspicion of expulsion of the device, however she never actually visualized the device after was expelled. She had an ultrasound on 3/10/2022 which did not show any evidence of IUD within the endometrium.\"      22 EMBx:  Final Diagnosis     A. ENDOMETRIUM, BIOPSY  --ENDOMETRIOID ADENOCARCINOMA, FIGO GRADE 1 OF 3, ARISING IN A BACKGROUND OF ATYPICAL ENDOMETRIAL HYPERPLASIA  --MISMATCH REPAIR PROTEIN IMMUNOHISTOCHEMISTRY IS NORMAL             Review of Systems:    Systemic           no weight changes; no fever; no chills; no night sweats; no appetite changes  Skin           no rashes, or lesions  Eye           no " irritation; no changes in vision  Ricky-Laryngeal           no dysphagia; no hoarseness   Pulmonary    no cough; no shortness of breath  Cardiovascular    no chest pain; no palpitations  Gastrointestinal    no diarrhea; no constipation; no abdominal pain; no changes in bowel  habits; no blood in stool  Genitourinary   no urinary frequency; no urinary urgency; no dysuria; no pain; no abnormal vaginal discharge; no abnormal vaginal bleeding  Breast   no breast discharge; no breast changes; no breast pain  Musculoskeletal    no myalgias; no arthralgias; no back pain  Psychiatric           no depressed mood; no anxiety    Hematologic           no tender lymph nodes; no noticeable swellings or lumps   Endocrine    no hot flashes; no heat/cold intolerance         Neurological   no tremor; no numbness and tingling; no headaches; no difficulty  sleeping      Past Medical History:    Past Medical History:   Diagnosis Date     HTN (hypertension)      Obese      Prediabetes      Renal failure     grade 1 - minimal         Past Surgical History:    Past Surgical History:   Procedure Laterality Date     CYSTOSCOPY N/A 6/8/2022    Procedure: look into bladder (cystoscopy), rectal insuffation test;  Surgeon: Kyaden Harmon MD;  Location: [facility] OR     CYSTOSCOPY  6/8/2022    Procedure: ;  Surgeon: Kayden Harmon MD;  Location: [facility] OR     LAPAROSCOPIC HYSTERECTOMY TOTAL, BILATERAL SALPINGO-OOPHORECTOMY, NODE DISSECTION, COMBINED N/A 6/8/2022    Procedure: Laparoscopy, removal of uterus, tubes, ovaries, cervix, bilateral sentinel  lymph nodes dissection;  Surgeon: Kayden Harmon MD;  Location: [facility] OR         Health Maintenance:  Health Maintenance Due   Topic Date Due     PREVENTIVE CARE VISIT  Never done     ADVANCE CARE PLANNING  Never done     COVID-19 Vaccine (1) Never done     HIV SCREENING  Never done     HEPATITIS C SCREENING  Never done     PHQ-2 (once per calendar year)  Never done       Last Pap  Smear: Up to date per patient - no abnormal paps    Last Mammogram: 2021 Nl per patient    Last Colonoscopy: none      Current Medications:     has a current medication list which includes the following prescription(s): acetaminophen, albuterol, aspirin, atorvastatin, fluticasone-salmeterol, ibuprofen, lisinopril, senna-docusate, and zinc.       Allergies:     Patient has no allergy information on record.        Social History:     Social History     Tobacco Use     Smoking status: Never Smoker     Smokeless tobacco: Never Used   Substance Use Topics     Alcohol use: Never       History   Drug Use Unknown           Physical Exam:     PS 0  VS: /69   Pulse 84   Temp 98.1  F (36.7  C) (Oral)   Resp 20   Wt (!) 154.7 kg (341 lb)   LMP 06/27/2022   SpO2 98%   BMI 53.41 kg/m      General Appearance: healthy and alert, no distress     HEENT:  no thyromegaly, no palpable nodules or masses        Cardiovascular: regular rate and rhythm, no gallops, rubs or murmurs     Respiratory: lungs clear, no rales, rhonchi or wheezes, normal diaphragmatic excursion    Musculoskeletal: extremities non tender and without edema    Skin: no lesions or rashes     Neurological: normal gait, no gross defects     Psychiatric: appropriate mood and affect                               Hematological: normal cervical, supraclavicular and inguinal lymph nodes     Gastrointestinal:       abdomen soft, non-tender, non-distended, no organomegaly or masses    Genitourinary: External genitalia and urethral meatus appears normal.  Vagina is smooth without nodularity or masses.  Cervix appears normal and without lesions.  Bimanual exam reveal no masses, nodularity or fullness.  Recto-vaginal exam confirms these findings.      Assessment:    Indigo Gunter is a 42 year old woman with a new diagnosis of EMCA G1.     A total of 60 minutes was spent with the patient, 40 minutes of which were spent in counseling the patient and/or treatment  planning.      Plan:     1.)   EMCA - She is doing well in recovery at this point and appears to be recovering from the acute effects of surgery.  We have discussed the clinical and pathologic findings at length and options for treatment including but not limited to observation, chemo and RT.  As it stands she remains at low risk of recurrence and I have recommended routine cares with observation only.  WE have discussed at length the sign and symptoms of recurrence.  She will return q6 months at present with our NP service and will return more promptly if the previously discussed signs or symptoms occur. At the conclusion of a comprehensive discussion of the potential risks and benefits of each approach, she appears inclined to observation as outline above.    2.) Genetic risk factors were assessed and the patient does not meet the qualifications for a referral.      3.) Labs and/or tests ordered include:  none     4.) Health maintenance issues addressed today include none.      Thank you for allowing us to participate in the care of your patient.         Sincerely,    Kayden Harmon MD    CC  Patient Care Team:  Kary Garcia APRN CNP as PCP - General  Kayden Harmon MD as Assigned Cancer Care Provider  SELF, REFERRED

## 2022-06-27 NOTE — LETTER
"    2022         RE: Indigo Gunter  [address]  [place] [address]        Dear Colleague,    Thank you for referring your patient, Indigo Gunter, to the [facility]. Please see a copy of my visit note below.                            Consult Notes on Referred Patient    Date: 5/15/2022       Dr. Ratna Avina MD  No address on file       RE: Indigo Gunter  : 1980  MARY: 2022    Dear Dr. Referred Self:    I had the pleasure of seeing your patient Indigo Gunter here at the Gynecologic Cancer Clinic at the [facility] on 2022.  As you know she is a very pleasant 42 year old woman with a recent diagnosis of  EMCA G1.  Given these findings she was subsequently sent to the Gynecologic Cancer Clinic for new patient consultation.     She was recently evaluated by DR. Dumont who reported the following:    \" Indigo Gunter is a 42 Y  female who presents for surgical consultation.     The patient continues to have heavy menstrual bleeding. Due to the heavy bleeding she was having last week and, she required the use of Provera. She has not had unprotected intercourse for the past 2 weeks, and desires to proceed with Depo-Provera in the meantime until her surgery can be performed.    The patient desires to proceed with definitive management of her heavy menstrual bleeding with robotic assisted total laparoscopic hysterectomy.    The patient had previous had an IUD placed, however this was expelled. Her second IUD had been inserted. Due to heavy bleeding, she had a high index of suspicion of expulsion of the device, however she never actually visualized the device after was expelled. She had an ultrasound on 3/10/2022 which did not show any evidence of IUD within the endometrium.\"      22 EMBx:  Final Diagnosis     A. ENDOMETRIUM, BIOPSY  --ENDOMETRIOID ADENOCARCINOMA, FIGO GRADE 1 OF 3, ARISING IN A BACKGROUND OF ATYPICAL " ENDOMETRIAL HYPERPLASIA  --MISMATCH REPAIR PROTEIN IMMUNOHISTOCHEMISTRY IS NORMAL             Review of Systems:    Systemic           no weight changes; no fever; no chills; no night sweats; no appetite changes  Skin           no rashes, or lesions  Eye           no irritation; no changes in vision  Ricky-Laryngeal           no dysphagia; no hoarseness   Pulmonary    no cough; no shortness of breath  Cardiovascular    no chest pain; no palpitations  Gastrointestinal    no diarrhea; no constipation; no abdominal pain; no changes in bowel  habits; no blood in stool  Genitourinary   no urinary frequency; no urinary urgency; no dysuria; no pain; no abnormal vaginal discharge; no abnormal vaginal bleeding  Breast   no breast discharge; no breast changes; no breast pain  Musculoskeletal    no myalgias; no arthralgias; no back pain  Psychiatric           no depressed mood; no anxiety    Hematologic           no tender lymph nodes; no noticeable swellings or lumps   Endocrine    no hot flashes; no heat/cold intolerance         Neurological   no tremor; no numbness and tingling; no headaches; no difficulty  sleeping      Past Medical History:    Past Medical History:   Diagnosis Date     HTN (hypertension)      Obese      Prediabetes      Renal failure     grade 1 - minimal         Past Surgical History:    Past Surgical History:   Procedure Laterality Date     CYSTOSCOPY N/A 6/8/2022    Procedure: look into bladder (cystoscopy), rectal insuffation test;  Surgeon: Kayden Harmon MD;  Location: [facility]     CYSTOSCOPY  6/8/2022    Procedure: ;  Surgeon: Kayden Harmon MD;  Location: [facility]     LAPAROSCOPIC HYSTERECTOMY TOTAL, BILATERAL SALPINGO-OOPHORECTOMY, NODE DISSECTION, COMBINED N/A 6/8/2022    Procedure: Laparoscopy, removal of uterus, tubes, ovaries, cervix, bilateral sentinel  lymph nodes dissection;  Surgeon: Kayden Harmon MD;  Location:  OR         Health Maintenance:  Health  Maintenance Due   Topic Date Due     PREVENTIVE CARE VISIT  Never done     ADVANCE CARE PLANNING  Never done     COVID-19 Vaccine (1) Never done     HIV SCREENING  Never done     HEPATITIS C SCREENING  Never done     PHQ-2 (once per calendar year)  Never done       Last Pap Smear: Up to date per patient - no abnormal paps    Last Mammogram: 2021 Nl per patient    Last Colonoscopy: none      Current Medications:     has a current medication list which includes the following prescription(s): acetaminophen, albuterol, aspirin, atorvastatin, fluticasone-salmeterol, ibuprofen, lisinopril, senna-docusate, and zinc.       Allergies:     Patient has no allergy information on record.        Social History:     Social History     Tobacco Use     Smoking status: Never Smoker     Smokeless tobacco: Never Used   Substance Use Topics     Alcohol use: Never       History   Drug Use Unknown           Physical Exam:     PS 0  VS: /69   Pulse 84   Temp 98.1  F (36.7  C) (Oral)   Resp 20   Wt (!) 154.7 kg (341 lb)   LMP 06/27/2022   SpO2 98%   BMI 53.41 kg/m      General Appearance: healthy and alert, no distress     HEENT:  no thyromegaly, no palpable nodules or masses        Cardiovascular: regular rate and rhythm, no gallops, rubs or murmurs     Respiratory: lungs clear, no rales, rhonchi or wheezes, normal diaphragmatic excursion    Musculoskeletal: extremities non tender and without edema    Skin: no lesions or rashes     Neurological: normal gait, no gross defects     Psychiatric: appropriate mood and affect                               Hematological: normal cervical, supraclavicular and inguinal lymph nodes     Gastrointestinal:       abdomen soft, non-tender, non-distended, no organomegaly or masses    Genitourinary: External genitalia and urethral meatus appears normal.  Vagina is smooth without nodularity or masses.  Cervix appears normal and without lesions.  Bimanual exam reveal no masses, nodularity or  fullness.  Recto-vaginal exam confirms these findings.      Assessment:    Indigo Gunter is a 42 year old woman with a new diagnosis of EMCA G1.     A total of 60 minutes was spent with the patient, 40 minutes of which were spent in counseling the patient and/or treatment planning.      Plan:     1.)   EMCA - She is doing well in recovery at this point and appears to be recovering from the acute effects of surgery.  We have discussed the clinical and pathologic findings at length and options for treatment including but not limited to observation, chemo and RT.  As it stands she remains at low risk of recurrence and I have recommended routine cares with observation only.  WE have discussed at length the sign and symptoms of recurrence.  She will return q6 months at present with our NP service and will return more promptly if the previously discussed signs or symptoms occur. At the conclusion of a comprehensive discussion of the potential risks and benefits of each approach, she appears inclined to observation as outline above.    2.) Genetic risk factors were assessed and the patient does not meet the qualifications for a referral.      3.) Labs and/or tests ordered include:  none     4.) Health maintenance issues addressed today include none.      Thank you for allowing us to participate in the care of your patient.         Sincerely,    Kayden Harmon MD    CC  Patient Care Team:  Kary Garcia APRN CNP as PCP - General

## 2022-06-27 NOTE — NURSING NOTE
"Oncology Rooming Note    June 27, 2022 8:35 AM   Indigo Gunter is a 42 year old female who presents for:    No chief complaint on file.    Initial Vitals: /69   Pulse 84   Temp 98.1  F (36.7  C) (Oral)   Resp 20   Wt (!) 154.7 kg (341 lb)   LMP 06/27/2022   SpO2 98%   BMI 53.41 kg/m   Estimated body mass index is 53.41 kg/m  as calculated from the following:    Height as of 6/8/22: 1.702 m (5' 7\").    Weight as of this encounter: 154.7 kg (341 lb). Body surface area is 2.7 meters squared.  Mild Pain (2) Comment: Data Unavailable   Patient's last menstrual period was 06/27/2022.  Allergies reviewed: Yes  Medications reviewed: Yes    Medications: Medication refills not needed today.  Pharmacy name entered into EPIC: [id number] - [place], [place] - [address] [place] [address]    Clinical concerns: Patient states there are no new concerns to discuss with provider.  Dr Harmon was not notified.         Jenn Gardner CMA                "

## 2022-06-27 NOTE — PATIENT INSTRUCTIONS
It was a pleasure seeing you in clinic today-please reach out if there are any further questions that arise following today's visit.  During business hours, you may reach me at [phone].  For urgent/emergent questions after business hours, you may reach the on-call Gynecologic Oncology Resident through the [facility]  at [phone].    All normal test results are usually communicated via letter or i-drivehart message.  Abnormal results (those that require a change in the previously discussed plan of care) are usually communicated via a phone call.    I recommend signing up for [email or web address] access if you have not already done so.  This allows you online access to your lab results and also helps you communicate efficiently with your clinic should any questions arise in your care.    Follow up appointment in 6 months with NP, scheduling will call you to help make an appointment    Dr Chelsy Whaley RN  Phone:  [phone]  Fax:  [phone]

## 2022-06-29 ENCOUNTER — DOCUMENTATION ONLY (OUTPATIENT)
Dept: OTHER | Facility: CLINIC | Age: 42
End: 2022-06-29

## 2022-06-30 LAB
PATH REPORT.ADDENDUM SPEC: NORMAL
PATH REPORT.COMMENTS IMP SPEC: NORMAL
PATH REPORT.FINAL DX SPEC: NORMAL
PATH REPORT.GROSS SPEC: NORMAL
PATH REPORT.INTRAOP OBS SPEC DOC: NORMAL
PATH REPORT.MICROSCOPIC SPEC OTHER STN: NORMAL
PATH REPORT.RELEVANT HX SPEC: NORMAL
PATHOLOGY SYNOPTIC REPORT: NORMAL
PHOTO IMAGE: NORMAL

## 2022-07-03 ENCOUNTER — HEALTH MAINTENANCE LETTER (OUTPATIENT)
Age: 42
End: 2022-07-03

## 2023-01-06 NOTE — PROGRESS NOTES
"                        Consult Notes on Referred Patient      RE: Indigo Gunter  : 1980  MARY: 2023       I had the pleasure of seeing Indigo Gunter here at the Gynecologic Cancer Clinic at the [facility] on 2022.  As you know she is a very pleasant 42 year old woman with a recent diagnosis of  EMCA G1.  Given these findings she was subsequently sent to the Gynecologic Cancer Clinic for new patient consultation.     She was recently evaluated by DR. Dumont who reported the following:    \" Indigo Gunter is a 42 Y  female who presents for surgical consultation.     The patient continues to have heavy menstrual bleeding. Due to the heavy bleeding she was having last week and, she required the use of Provera. She has not had unprotected intercourse for the past 2 weeks, and desires to proceed with Depo-Provera in the meantime until her surgery can be performed.    The patient desires to proceed with definitive management of her heavy menstrual bleeding with robotic assisted total laparoscopic hysterectomy.    The patient had previous had an IUD placed, however this was expelled. Her second IUD had been inserted. Due to heavy bleeding, she had a high index of suspicion of expulsion of the device, however she never actually visualized the device after was expelled. She had an ultrasound on 3/10/2022 which did not show any evidence of IUD within the endometrium.\"      22 EMBx:  Final Diagnosis     A. ENDOMETRIUM, BIOPSY  --ENDOMETRIOID ADENOCARCINOMA, FIGO GRADE 1 OF 3, ARISING IN A BACKGROUND OF ATYPICAL ENDOMETRIAL HYPERPLASIA  --MISMATCH REPAIR PROTEIN IMMUNOHISTOCHEMISTRY IS NORMAL       2022 TLH/BSO and sentinel node biopsy    Final Diagnosis   A. Right pelvic sentinel lymph node, lymph node biopsy:  -One reactive lymph node, negative for malignancy (0/1)     B. Left pelvic sentinel lymph node, lymph node biopsy:  -One reactive lymph node, negative for malignancy " (0/1)     C. Uterus, cervix, bilateral fallopian tubes and ovaries, total hysterectomy and bilateral salpingo-oophorectomy:  -Endometrial endometrioid adenocarcinoma, FIGO grade 1, MMR-intact  -Endometrial polyps  -Endometrial stromal decidualization, consistent with exogenous progesterone effect  -Adenomyosis  -Uterine serosal endometriosis and fibrous adhesions  -Cervix with no significant histologic abnormality   -Ovaries with no significant histologic abnormality  -Left fallopian tube with no significant histologic abnormality  -Right fallopian tube endometriosis       She presents today for routine follow-up.          Review of Systems:    Systemic           no weight changes; no fever; no chills; no night sweats; no appetite changes  Skin           no rashes, or lesions  Eye           no irritation; no changes in vision  Ricky-Laryngeal           no dysphagia; no hoarseness   Pulmonary    no cough; no shortness of breath  Cardiovascular    no chest pain; no palpitations  Gastrointestinal    no diarrhea; no constipation; no abdominal pain; no changes in bowel  habits; no blood in stool  Genitourinary   no urinary frequency; no urinary urgency; no dysuria; no pain; no abnormal vaginal discharge; no abnormal vaginal bleeding  Breast   no breast discharge; no breast changes; no breast pain  Musculoskeletal    no myalgias; no arthralgias; no back pain  Psychiatric           no depressed mood; no anxiety    Hematologic           no tender lymph nodes; no noticeable swellings or lumps   Endocrine    no hot flashes; no heat/cold intolerance         Neurological   no tremor; no numbness and tingling; no headaches; no difficulty  sleeping      Past Medical History:    Past Medical History:   Diagnosis Date     HTN (hypertension)      Obese      Prediabetes      Renal failure     grade 1 - minimal         Past Surgical History:    Past Surgical History:   Procedure Laterality Date     CYSTOSCOPY N/A 6/8/2022    Procedure:  look into bladder (cystoscopy), rectal insuffation test;  Surgeon: Kayden Harmon MD;  Location: [facility]     CYSTOSCOPY  6/8/2022    Procedure: ;  Surgeon: Kayden Harmon MD;  Location: [facility]     LAPAROSCOPIC HYSTERECTOMY TOTAL, BILATERAL SALPINGO-OOPHORECTOMY, NODE DISSECTION, COMBINED N/A 6/8/2022    Procedure: Laparoscopy, removal of uterus, tubes, ovaries, cervix, bilateral sentinel  lymph nodes dissection;  Surgeon: Kayden Harmon MD;  Location: [facility]         Health Maintenance:  Health Maintenance Due   Topic Date Due     HEPATITIS B IMMUNIZATION (1 of 3 - 3-dose series) Never done     COVID-19 Vaccine (1) Never done     HIV SCREENING  Never done     HEPATITIS C SCREENING  Never done     INFLUENZA VACCINE (1) Never done     YEARLY PREVENTIVE VISIT  10/12/2022     PHQ-2 (once per calendar year)  Never done       Last Pap Smear: Up to date per patient - no abnormal paps    Last Mammogram: 2021 Nl per patient    Last Colonoscopy: none      Current Medications:     has a current medication list which includes the following prescription(s): acetaminophen, albuterol, aspirin, atorvastatin, fluticasone-salmeterol, ibuprofen, lisinopril, senna-docusate, and zinc.       Allergies:     Patient has no allergy information on record.        Social History:     Social History     Tobacco Use     Smoking status: Never     Smokeless tobacco: Never   Substance Use Topics     Alcohol use: Never       History   Drug Use Unknown           Physical Exam:     PS 0  VS: BP (!) 146/84   Pulse 84   Temp 97.6  F (36.4  C) (Oral)   Wt (!) 153.4 kg (338 lb 3.2 oz)   LMP 06/27/2022   SpO2 94%   BMI 52.97 kg/m       General Appearance: healthy and alert, no distress     HEENT:  no thyromegaly, no palpable nodules or masses        Cardiovascular: regular rate and rhythm, no gallops, rubs or murmurs     Respiratory: lungs clear, no rales, rhonchi or wheezes, normal diaphragmatic  excursion    Musculoskeletal: extremities non tender and without edema    Skin: no lesions or rashes     Neurological: normal gait, no gross defects     Psychiatric: appropriate mood and affect                               Hematological: normal cervical, supraclavicular and inguinal lymph nodes     Gastrointestinal:       abdomen soft, non-tender, non-distended, no organomegaly or masses    Genitourinary: External genitalia and urethral meatus appears normal.  Vagina is smooth without nodularity or masses.  Cervix appears normal and without lesions.  Bimanual exam reveal no masses, nodularity or fullness.  Recto-vaginal exam confirms these findings.      Assessment:    Indigo Gunter is a 42 year old woman with a new diagnosis of EMCA G1.     A total of 60 minutes was spent with the patient, 40 minutes of which were spent in counseling the patient and/or treatment planning.      Plan:     1.)   EMCA - LOU on examination today.  Will continue to follow q6 months x 5 years.  Can transfer to primary OB/GYN at present )she does not have one right now - so I have scheduled an appointment here for her with out NP service).    2.) Genetic risk factors were assessed and the patient does not meet the qualifications for a referral.      3.) Labs and/or tests ordered include:  none     4.) Health maintenance issues addressed today include none.      Thank you for allowing us to participate in the care of your patient.         Sincerely,    Kayden Harmon MD    CC  Patient Care Team:  Kary Garcia APRN CNP as PCP - General  Eden, Kayden Falcon MD as Assigned Cancer Care Provider  SELF, REFERRED

## 2023-01-08 ENCOUNTER — HEALTH MAINTENANCE LETTER (OUTPATIENT)
Age: 43
End: 2023-01-08

## 2023-01-09 ENCOUNTER — ONCOLOGY VISIT (OUTPATIENT)
Dept: ONCOLOGY | Facility: CLINIC | Age: 43
End: 2023-01-09
Attending: OBSTETRICS & GYNECOLOGY
Payer: COMMERCIAL

## 2023-01-09 VITALS
HEART RATE: 84 BPM | TEMPERATURE: 97.6 F | SYSTOLIC BLOOD PRESSURE: 146 MMHG | OXYGEN SATURATION: 94 % | DIASTOLIC BLOOD PRESSURE: 84 MMHG | WEIGHT: 293 LBS | BODY MASS INDEX: 52.97 KG/M2

## 2023-01-09 DIAGNOSIS — C54.1 ENDOMETRIAL CANCER (H): Primary | ICD-10-CM

## 2023-01-09 PROCEDURE — 99215 OFFICE O/P EST HI 40 MIN: CPT | Performed by: OBSTETRICS & GYNECOLOGY

## 2023-01-09 PROCEDURE — G0463 HOSPITAL OUTPT CLINIC VISIT: HCPCS

## 2023-01-09 PROCEDURE — G0463 HOSPITAL OUTPT CLINIC VISIT: HCPCS | Performed by: OBSTETRICS & GYNECOLOGY

## 2023-01-09 RX ORDER — LISINOPRIL 10 MG/1
1 TABLET ORAL DAILY
COMMUNITY
Start: 2022-04-19 | End: 2023-04-19

## 2023-01-09 RX ORDER — ASPIRIN 81 MG/1
81 TABLET, CHEWABLE ORAL
COMMUNITY
Start: 2022-04-19 | End: 2023-01-09

## 2023-01-09 RX ORDER — ATORVASTATIN CALCIUM 40 MG/1
1 TABLET, FILM COATED ORAL DAILY
COMMUNITY
Start: 2022-04-19 | End: 2023-01-09

## 2023-01-09 RX ORDER — IBUPROFEN 600 MG/1
TABLET, FILM COATED ORAL
COMMUNITY
Start: 2022-05-13

## 2023-01-09 RX ORDER — METFORMIN HCL 500 MG
500 TABLET, EXTENDED RELEASE 24 HR ORAL 2 TIMES DAILY WITH MEALS
COMMUNITY
Start: 2022-10-14

## 2023-01-09 RX ORDER — MEDROXYPROGESTERONE ACETATE 150 MG/ML
150 INJECTION, SUSPENSION INTRAMUSCULAR
COMMUNITY
Start: 2022-04-25 | End: 2023-06-19

## 2023-01-09 ASSESSMENT — PAIN SCALES - GENERAL: PAINLEVEL: NO PAIN (0)

## 2023-01-09 NOTE — LETTER
"    2023         RE: Indigo Gunter  [address] [facility] [address]        Dear Colleague,    Thank you for referring your patient, Indigo Gunter, to the [facility]. Please see a copy of my visit note below.                            Consult Notes on Referred Patient      RE: Indigo Gunter  : 1980  MARY: 2023       I had the pleasure of seeing Indigo Gunter here at the Gynecologic Cancer Clinic at the [facility] on 2022.  As you know she is a very pleasant 42 year old woman with a recent diagnosis of  EMCA G1.  Given these findings she was subsequently sent to the Gynecologic Cancer Clinic for new patient consultation.     She was recently evaluated by DR. Dumont who reported the following:    \" Indigo Gunter is a 42 Y  female who presents for surgical consultation.     The patient continues to have heavy menstrual bleeding. Due to the heavy bleeding she was having last week and, she required the use of Provera. She has not had unprotected intercourse for the past 2 weeks, and desires to proceed with Depo-Provera in the meantime until her surgery can be performed.    The patient desires to proceed with definitive management of her heavy menstrual bleeding with robotic assisted total laparoscopic hysterectomy.    The patient had previous had an IUD placed, however this was expelled. Her second IUD had been inserted. Due to heavy bleeding, she had a high index of suspicion of expulsion of the device, however she never actually visualized the device after was expelled. She had an ultrasound on 3/10/2022 which did not show any evidence of IUD within the endometrium.\"      22 EMBx:  Final Diagnosis     A. ENDOMETRIUM, BIOPSY  --ENDOMETRIOID ADENOCARCINOMA, FIGO GRADE 1 OF 3, ARISING IN A BACKGROUND OF ATYPICAL ENDOMETRIAL HYPERPLASIA  --MISMATCH REPAIR PROTEIN IMMUNOHISTOCHEMISTRY IS NORMAL       2022 TLH/BSO and sentinel node " biopsy    Final Diagnosis   A. Right pelvic sentinel lymph node, lymph node biopsy:  -One reactive lymph node, negative for malignancy (0/1)     B. Left pelvic sentinel lymph node, lymph node biopsy:  -One reactive lymph node, negative for malignancy (0/1)     C. Uterus, cervix, bilateral fallopian tubes and ovaries, total hysterectomy and bilateral salpingo-oophorectomy:  -Endometrial endometrioid adenocarcinoma, FIGO grade 1, MMR-intact  -Endometrial polyps  -Endometrial stromal decidualization, consistent with exogenous progesterone effect  -Adenomyosis  -Uterine serosal endometriosis and fibrous adhesions  -Cervix with no significant histologic abnormality   -Ovaries with no significant histologic abnormality  -Left fallopian tube with no significant histologic abnormality  -Right fallopian tube endometriosis       She presents today for routine follow-up.          Review of Systems:    Systemic           no weight changes; no fever; no chills; no night sweats; no appetite changes  Skin           no rashes, or lesions  Eye           no irritation; no changes in vision  Ricky-Laryngeal           no dysphagia; no hoarseness   Pulmonary    no cough; no shortness of breath  Cardiovascular    no chest pain; no palpitations  Gastrointestinal    no diarrhea; no constipation; no abdominal pain; no changes in bowel  habits; no blood in stool  Genitourinary   no urinary frequency; no urinary urgency; no dysuria; no pain; no abnormal vaginal discharge; no abnormal vaginal bleeding  Breast   no breast discharge; no breast changes; no breast pain  Musculoskeletal    no myalgias; no arthralgias; no back pain  Psychiatric           no depressed mood; no anxiety    Hematologic           no tender lymph nodes; no noticeable swellings or lumps   Endocrine    no hot flashes; no heat/cold intolerance         Neurological   no tremor; no numbness and tingling; no headaches; no difficulty  sleeping      Past Medical History:    Past  Medical History:   Diagnosis Date     HTN (hypertension)      Obese      Prediabetes      Renal failure     grade 1 - minimal         Past Surgical History:    Past Surgical History:   Procedure Laterality Date     CYSTOSCOPY N/A 6/8/2022    Procedure: look into bladder (cystoscopy), rectal insuffation test;  Surgeon: Kayden Harmon MD;  Location: [facility] OR     CYSTOSCOPY  6/8/2022    Procedure: ;  Surgeon: Kayden Harmon MD;  Location: [facility] OR     LAPAROSCOPIC HYSTERECTOMY TOTAL, BILATERAL SALPINGO-OOPHORECTOMY, NODE DISSECTION, COMBINED N/A 6/8/2022    Procedure: Laparoscopy, removal of uterus, tubes, ovaries, cervix, bilateral sentinel  lymph nodes dissection;  Surgeon: Kayden Harmon MD;  Location: [facility] OR         Health Maintenance:  Health Maintenance Due   Topic Date Due     HEPATITIS B IMMUNIZATION (1 of 3 - 3-dose series) Never done     COVID-19 Vaccine (1) Never done     HIV SCREENING  Never done     HEPATITIS C SCREENING  Never done     INFLUENZA VACCINE (1) Never done     YEARLY PREVENTIVE VISIT  10/12/2022     PHQ-2 (once per calendar year)  Never done       Last Pap Smear: Up to date per patient - no abnormal paps    Last Mammogram: 2021 Nl per patient    Last Colonoscopy: none      Current Medications:     has a current medication list which includes the following prescription(s): acetaminophen, albuterol, aspirin, atorvastatin, fluticasone-salmeterol, ibuprofen, lisinopril, senna-docusate, and zinc.       Allergies:     Patient has no allergy information on record.        Social History:     Social History     Tobacco Use     Smoking status: Never     Smokeless tobacco: Never   Substance Use Topics     Alcohol use: Never       History   Drug Use Unknown           Physical Exam:     PS 0  VS: BP (!) 146/84   Pulse 84   Temp 97.6  F (36.4  C) (Oral)   Wt (!) 153.4 kg (338 lb 3.2 oz)   LMP 06/27/2022   SpO2 94%   BMI 52.97 kg/m       General Appearance: healthy and alert,  no distress     HEENT:  no thyromegaly, no palpable nodules or masses        Cardiovascular: regular rate and rhythm, no gallops, rubs or murmurs     Respiratory: lungs clear, no rales, rhonchi or wheezes, normal diaphragmatic excursion    Musculoskeletal: extremities non tender and without edema    Skin: no lesions or rashes     Neurological: normal gait, no gross defects     Psychiatric: appropriate mood and affect                               Hematological: normal cervical, supraclavicular and inguinal lymph nodes     Gastrointestinal:       abdomen soft, non-tender, non-distended, no organomegaly or masses    Genitourinary: External genitalia and urethral meatus appears normal.  Vagina is smooth without nodularity or masses.  Cervix appears normal and without lesions.  Bimanual exam reveal no masses, nodularity or fullness.  Recto-vaginal exam confirms these findings.      Assessment:    Indigo Gunter is a 42 year old woman with a new diagnosis of EMCA G1.     A total of 60 minutes was spent with the patient, 40 minutes of which were spent in counseling the patient and/or treatment planning.      Plan:     1.)   EMCA - LOU on examination today.  Will continue to follow q6 months x 5 years.  Can transfer to primary OB/GYN at present )she does not have one right now - so I have scheduled an appointment here for her with out NP service).    2.) Genetic risk factors were assessed and the patient does not meet the qualifications for a referral.      3.) Labs and/or tests ordered include:  none     4.) Health maintenance issues addressed today include none.      Thank you for allowing us to participate in the care of your patient.         Sincerely,    Kayden Harmon MD    CC  Patient Care Team:  Kary Garcia APRN CNP as PCP - General  Eden, Kayden Falcon MD as Assigned Cancer Care Provider

## 2023-01-09 NOTE — NURSING NOTE
"Oncology Rooming Note    January 9, 2023 9:17 AM   Indigo Gunter is a 42 year old female who presents for:    Chief Complaint   Patient presents with     Oncology Clinic Visit     Endometrial cancer     Initial Vitals: BP (!) 146/84   Pulse 84   Temp 97.6  F (36.4  C) (Oral)   Wt (!) 153.4 kg (338 lb 3.2 oz)   LMP 06/27/2022   SpO2 94%   BMI 52.97 kg/m   Estimated body mass index is 52.97 kg/m  as calculated from the following:    Height as of 6/8/22: 1.702 m (5' 7\").    Weight as of this encounter: 153.4 kg (338 lb 3.2 oz). Body surface area is 2.69 meters squared.  No Pain (0) Comment: Data Unavailable   Patient's last menstrual period was 06/27/2022.  Allergies reviewed: Yes  Medications reviewed: Yes    Medications: Medication refills not needed today.  Pharmacy name entered into EPIC: [id number] - [place], [place] - [address] [place] [address]    Clinical concerns: NONE       Carmen Palacios EMT            "

## 2023-09-18 ENCOUNTER — PATIENT OUTREACH (OUTPATIENT)
Dept: ONCOLOGY | Facility: CLINIC | Age: 43
End: 2023-09-18
Payer: COMMERCIAL

## 2023-09-18 NOTE — PROGRESS NOTES
[facility]: Cancer Care                                                                                          Attempted to call patient today to discuss the cancer treatment summary that was prepared and sent via [email or web address]. Left a generic voice message requesting a call back at my direct phone number: [phone].    Lu Mcdonald RN BSN  Cancer Survivorship Coordinator

## 2023-09-28 NOTE — PROGRESS NOTES
[facility]: Cancer Care                                                                                          Attempted to reach patient two separate times to review survivorship care plan. Patient was not able to be reached at this time. A call back number was left for the patient if they have any future survivorship needs. No additional attempts will be made.    Lu Mcdonald RN BSN  Cancer Survivorship Coordinator

## 2024-02-10 ENCOUNTER — HEALTH MAINTENANCE LETTER (OUTPATIENT)
Age: 44
End: 2024-02-10

## (undated) DEVICE — SUCTION MANIFOLD NEPTUNE 2 SYS 4 PORT 0702-020-000

## (undated) DEVICE — SPONGE LAP 18X18" X8435

## (undated) DEVICE — DEVICE ENDO STITCH APPLIER 10MM 173016

## (undated) DEVICE — KOH COLPOTOMIZER OCCLUDER  CPO-6

## (undated) DEVICE — TUBING IRRIG CYSTO/BLADDER SET 81" LF 2C4040

## (undated) DEVICE — ENDO TROCAR BLADELESS 12X100MM B12LT

## (undated) DEVICE — DRSG TEGADERM 2 3/8X2 3/4" 1624W

## (undated) DEVICE — NDL INSUFFLATION 13GA 120MM C2201

## (undated) DEVICE — UTERINE MANIPULATOR RUMI 6.7MMX8CM UMB678

## (undated) DEVICE — SOL WATER IRRIG 1000ML BOTTLE 2F7114

## (undated) DEVICE — SU MONOCRYL 4-0 PS-2 27" UND Y426H

## (undated) DEVICE — GLOVE GAMMEX NEOPRENE ULTRA SZ 7.5 LF 8515

## (undated) DEVICE — SUCTION IRR STRYKERFLOW II W/TIP 250-070-520

## (undated) DEVICE — SPECIMEN TRAP MUCOUS 40ML LUKI C30200A

## (undated) DEVICE — PREP POVIDONE-IODINE 7.5% SCRUB 4OZ BOTTLE MDS093945

## (undated) DEVICE — LINEN TOWEL PACK X6 WHITE 5487

## (undated) DEVICE — SU VICRYL 0 TIE 54" J608H

## (undated) DEVICE — KIT PATIENT POSITIONING PIGAZZI LATEX FREE 40580

## (undated) DEVICE — ESU ENDO SCISSORS 5MM CVD 5DCS

## (undated) DEVICE — DRSG GAUZE 2X2" 8042

## (undated) DEVICE — NDL SPINAL 22GA 3.5" QUINCKE 405181

## (undated) DEVICE — LINEN TOWEL PACK X30 5481

## (undated) DEVICE — ENDO TROCAR FIRST ENTRY KII FIOS Z-THRD 05X100MM CTF03

## (undated) DEVICE — PREP POVIDONE IODINE SOLUTION 10% 4OZ BOTTLE 29906-004

## (undated) DEVICE — DRAPE SHEET MED 44X70" 9355

## (undated) DEVICE — SOL ADH LIQUID BENZOIN SWAB 0.6ML C1544

## (undated) DEVICE — TUBING SMOKE EVAC PLUME PORT PP010CS

## (undated) DEVICE — DEVICE SUTURE GRASPER TROCAR CLOSURE 14GA PMITCSG

## (undated) DEVICE — Device

## (undated) DEVICE — SU VICRYL 0 UR-6 27" J603H

## (undated) DEVICE — ENDO TROCAR SLEEVE KII Z-THREADED 05X100MM CTS02

## (undated) DEVICE — ESU LIGASURE LAPAROSCOPIC BLUNT TIP SEALER 5MMX37CM LF1837

## (undated) DEVICE — ESU PENCIL W/COATED BLADE E2450H

## (undated) DEVICE — PREP CHLORAPREP 26ML TINTED HI-LITE ORANGE 930815

## (undated) DEVICE — SU ENDO STITCH POLYSORB 2-0 ES-9 48"  170053

## (undated) DEVICE — SU VICRYL 2-0 CT-2 27" J333H

## (undated) DEVICE — SOL NACL 0.9% INJ 1000ML BAG 2B1324X

## (undated) DEVICE — JELLY LUBRICATING SURGILUBE 2OZ TUBE

## (undated) DEVICE — WIPES FOLEY CARE SURESTEP PROVON DFC100

## (undated) DEVICE — ESU GROUND PAD ADULT W/CORD E7507

## (undated) RX ORDER — FENTANYL CITRATE 50 UG/ML
INJECTION, SOLUTION INTRAMUSCULAR; INTRAVENOUS
Status: DISPENSED
Start: 2022-06-08

## (undated) RX ORDER — METHOCARBAMOL 500 MG/1
TABLET, FILM COATED ORAL
Status: DISPENSED
Start: 2022-06-08

## (undated) RX ORDER — OXYCODONE HYDROCHLORIDE 5 MG/1
TABLET ORAL
Status: DISPENSED
Start: 2022-06-08

## (undated) RX ORDER — HYDROMORPHONE HCL IN WATER/PF 6 MG/30 ML
PATIENT CONTROLLED ANALGESIA SYRINGE INTRAVENOUS
Status: DISPENSED
Start: 2022-06-08

## (undated) RX ORDER — PROPOFOL 10 MG/ML
INJECTION, EMULSION INTRAVENOUS
Status: DISPENSED
Start: 2022-06-08

## (undated) RX ORDER — ONDANSETRON 2 MG/ML
INJECTION INTRAMUSCULAR; INTRAVENOUS
Status: DISPENSED
Start: 2022-06-08

## (undated) RX ORDER — METRONIDAZOLE 500 MG/100ML
INJECTION, SOLUTION INTRAVENOUS
Status: DISPENSED
Start: 2022-06-08

## (undated) RX ORDER — INDOCYANINE GREEN AND WATER 25 MG
KIT INJECTION
Status: DISPENSED
Start: 2022-06-08

## (undated) RX ORDER — LIDOCAINE HYDROCHLORIDE 20 MG/ML
INJECTION, SOLUTION EPIDURAL; INFILTRATION; INTRACAUDAL; PERINEURAL
Status: DISPENSED
Start: 2022-06-08

## (undated) RX ORDER — HYDROMORPHONE HYDROCHLORIDE 1 MG/ML
INJECTION, SOLUTION INTRAMUSCULAR; INTRAVENOUS; SUBCUTANEOUS
Status: DISPENSED
Start: 2022-06-08

## (undated) RX ORDER — HEPARIN SODIUM 5000 [USP'U]/.5ML
INJECTION, SOLUTION INTRAVENOUS; SUBCUTANEOUS
Status: DISPENSED
Start: 2022-06-08

## (undated) RX ORDER — KETOROLAC TROMETHAMINE 30 MG/ML
INJECTION, SOLUTION INTRAMUSCULAR; INTRAVENOUS
Status: DISPENSED
Start: 2022-06-08

## (undated) RX ORDER — PHENAZOPYRIDINE HYDROCHLORIDE 200 MG/1
TABLET, FILM COATED ORAL
Status: DISPENSED
Start: 2022-06-08

## (undated) RX ORDER — ACETAMINOPHEN 325 MG/1
TABLET ORAL
Status: DISPENSED
Start: 2022-06-08

## (undated) RX ORDER — CEFAZOLIN SODIUM/WATER 3 G/30 ML
SYRINGE (ML) INTRAVENOUS
Status: DISPENSED
Start: 2022-06-08

## (undated) RX ORDER — BUPIVACAINE HYDROCHLORIDE 2.5 MG/ML
INJECTION, SOLUTION EPIDURAL; INFILTRATION; INTRACAUDAL
Status: DISPENSED
Start: 2022-06-08